# Patient Record
Sex: FEMALE | Race: WHITE | NOT HISPANIC OR LATINO | Employment: OTHER | URBAN - METROPOLITAN AREA
[De-identification: names, ages, dates, MRNs, and addresses within clinical notes are randomized per-mention and may not be internally consistent; named-entity substitution may affect disease eponyms.]

---

## 2017-03-02 ENCOUNTER — ALLSCRIPTS OFFICE VISIT (OUTPATIENT)
Dept: OTHER | Facility: OTHER | Age: 82
End: 2017-03-02

## 2017-03-02 DIAGNOSIS — Z00.00 ENCOUNTER FOR GENERAL ADULT MEDICAL EXAMINATION WITHOUT ABNORMAL FINDINGS: ICD-10-CM

## 2017-03-02 DIAGNOSIS — E78.5 HYPERLIPIDEMIA: ICD-10-CM

## 2017-06-14 ENCOUNTER — GENERIC CONVERSION - ENCOUNTER (OUTPATIENT)
Dept: OTHER | Facility: OTHER | Age: 82
End: 2017-06-14

## 2017-06-14 ENCOUNTER — ALLSCRIPTS OFFICE VISIT (OUTPATIENT)
Dept: OTHER | Facility: OTHER | Age: 82
End: 2017-06-14

## 2017-06-14 DIAGNOSIS — E78.5 HYPERLIPIDEMIA: ICD-10-CM

## 2017-06-14 DIAGNOSIS — Z00.00 ENCOUNTER FOR GENERAL ADULT MEDICAL EXAMINATION WITHOUT ABNORMAL FINDINGS: ICD-10-CM

## 2017-10-13 ENCOUNTER — TRANSCRIBE ORDERS (OUTPATIENT)
Dept: ADMINISTRATIVE | Facility: HOSPITAL | Age: 82
End: 2017-10-13

## 2017-10-13 ENCOUNTER — GENERIC CONVERSION - ENCOUNTER (OUTPATIENT)
Dept: OTHER | Facility: OTHER | Age: 82
End: 2017-10-13

## 2017-10-13 ENCOUNTER — APPOINTMENT (OUTPATIENT)
Dept: LAB | Facility: HOSPITAL | Age: 82
End: 2017-10-13
Attending: INTERNAL MEDICINE
Payer: MEDICARE

## 2017-10-13 DIAGNOSIS — E78.49 OTHER HYPERLIPIDEMIA: ICD-10-CM

## 2017-10-13 DIAGNOSIS — E78.49 OTHER HYPERLIPIDEMIA: Primary | ICD-10-CM

## 2017-10-13 LAB
ALBUMIN SERPL BCP-MCNC: 3.4 G/DL (ref 3.5–5)
ALP SERPL-CCNC: 96 U/L (ref 46–116)
ALT SERPL W P-5'-P-CCNC: 19 U/L (ref 12–78)
ANION GAP SERPL CALCULATED.3IONS-SCNC: 7 MMOL/L (ref 4–13)
AST SERPL W P-5'-P-CCNC: 18 U/L (ref 5–45)
BASOPHILS # BLD AUTO: 0.06 THOUSANDS/ΜL (ref 0–0.1)
BASOPHILS NFR BLD AUTO: 1 % (ref 0–1)
BILIRUB SERPL-MCNC: 0.6 MG/DL (ref 0.2–1)
BUN SERPL-MCNC: 17 MG/DL (ref 5–25)
CALCIUM SERPL-MCNC: 9.1 MG/DL (ref 8.3–10.1)
CHLORIDE SERPL-SCNC: 105 MMOL/L (ref 100–108)
CHOLEST SERPL-MCNC: 182 MG/DL (ref 50–200)
CO2 SERPL-SCNC: 29 MMOL/L (ref 21–32)
CREAT SERPL-MCNC: 0.52 MG/DL (ref 0.6–1.3)
EOSINOPHIL # BLD AUTO: 0.33 THOUSAND/ΜL (ref 0–0.61)
EOSINOPHIL NFR BLD AUTO: 4 % (ref 0–6)
ERYTHROCYTE [DISTWIDTH] IN BLOOD BY AUTOMATED COUNT: 14.9 % (ref 11.6–15.1)
GFR SERPL CREATININE-BSD FRML MDRD: 84 ML/MIN/1.73SQ M
GLUCOSE P FAST SERPL-MCNC: 96 MG/DL (ref 65–99)
HCT VFR BLD AUTO: 44.4 % (ref 34.8–46.1)
HDLC SERPL-MCNC: 69 MG/DL (ref 40–60)
HGB BLD-MCNC: 14.2 G/DL (ref 11.5–15.4)
LDLC SERPL CALC-MCNC: 95 MG/DL (ref 0–100)
LYMPHOCYTES # BLD AUTO: 1.63 THOUSANDS/ΜL (ref 0.6–4.47)
LYMPHOCYTES NFR BLD AUTO: 22 % (ref 14–44)
MCH RBC QN AUTO: 29.1 PG (ref 26.8–34.3)
MCHC RBC AUTO-ENTMCNC: 32 G/DL (ref 31.4–37.4)
MCV RBC AUTO: 91 FL (ref 82–98)
MONOCYTES # BLD AUTO: 0.97 THOUSAND/ΜL (ref 0.17–1.22)
MONOCYTES NFR BLD AUTO: 13 % (ref 4–12)
NEUTROPHILS # BLD AUTO: 4.57 THOUSANDS/ΜL (ref 1.85–7.62)
NEUTS SEG NFR BLD AUTO: 60 % (ref 43–75)
PLATELET # BLD AUTO: 178 THOUSANDS/UL (ref 149–390)
PMV BLD AUTO: 11.9 FL (ref 8.9–12.7)
POTASSIUM SERPL-SCNC: 3.9 MMOL/L (ref 3.5–5.3)
PROT SERPL-MCNC: 7.5 G/DL (ref 6.4–8.2)
RBC # BLD AUTO: 4.88 MILLION/UL (ref 3.81–5.12)
SODIUM SERPL-SCNC: 141 MMOL/L (ref 136–145)
TRIGL SERPL-MCNC: 89 MG/DL
TSH SERPL DL<=0.05 MIU/L-ACNC: 2.32 UIU/ML (ref 0.36–3.74)
WBC # BLD AUTO: 7.56 THOUSAND/UL (ref 4.31–10.16)

## 2017-10-13 PROCEDURE — 80053 COMPREHEN METABOLIC PANEL: CPT

## 2017-10-13 PROCEDURE — 84443 ASSAY THYROID STIM HORMONE: CPT

## 2017-10-13 PROCEDURE — 85025 COMPLETE CBC W/AUTO DIFF WBC: CPT

## 2017-10-13 PROCEDURE — 36415 COLL VENOUS BLD VENIPUNCTURE: CPT

## 2017-10-13 PROCEDURE — 80061 LIPID PANEL: CPT

## 2017-10-16 ENCOUNTER — ALLSCRIPTS OFFICE VISIT (OUTPATIENT)
Dept: OTHER | Facility: OTHER | Age: 82
End: 2017-10-16

## 2017-10-17 NOTE — PROGRESS NOTES
Assessment  1  Hypertension (401 9) (I10)  2  Hyperlipidemia (272 4) (E78 5)  3  History of Afib (427 31) (I48 91)  4  Advanced age (18) (R50)  11  Other specified counseling (V65 49) (Z71 89)    Plan  Hyperlipidemia    · Renew: Ezetimibe 10 MG Oral Tablet (Zetia); take 1 tablet by mouth once daily  Hypertension    · Renew: Lisinopril 20 MG Oral Tablet; take 1 tablet by mouth once daily   · A diet low in sodium and high in potassium, magnesium, and calcium can help your blood pressure ;  Status:Complete;   Done: 47YHN8671   · Continue with our present treatment plan ; Status:Complete;   Done: 92FWJ2149   · Take your blood pressure twice a week  Record the numbers and bring them with you to your  appointments ; Status:Complete;   Done: 42MNY2061  Need for influenza vaccination    · Administer: Fluzone High-Dose 0 5 ML Intramuscular Suspension Prefilled Syringe; INJECT 0 5  ML  Intramuscular; To Be Done: 44HAM3166  Other specified counseling    · Begin a limited exercise program ; Status:Complete;   Done: 56RZC5194  PMH: Screening for genitourinary condition    · *VB - Urinary Incontinence Screen (Dx Z13 89 Screen for UI); Status:Complete;   Done: 20YQT9643  10:31AM    Discussion/Summary  Discussion Summary:   HTN, good control, acceptable for ageimproved on zetia, does not toleratestatinage, uses a cane for balance5 wishes and encouraged to bring in to officeand prevnar done today  Counseling Documentation With Imm: The patient was counseled regarding instructions for management  Medication SE Review and Pt Understands Tx: Possible side effects of new medications were reviewed with the patient/guardian today  The treatment plan was reviewed with the patient/guardian  The patient/guardian understands and agrees with the treatment plan      Chief Complaint  Chief Complaint Free Text Note Form: Pt is here for routine follow up  Med list reviewed  Refill esent for Zetia  Lab results in chart   CR   Chief Complaint Chronic Condition [facility]: Patient is here today for follow up of chronic conditions described in HPI  Review of Systems  Complete-Female:   Constitutional: No fever, no chills, feels well, no tiredness, no recent weight gain or weight loss  Eyes: No complaints of eye pain, no red eyes, no eyesight problems, no discharge, no dry eyes, no itching of eyes  ENT: no complaints of earache, no loss of hearing, no nose bleeds, no nasal discharge, no sore throat, no hoarseness  Cardiovascular: No complaints of slow heart rate, no fast heart rate, no chest pain, no palpitations, no leg claudication, no lower extremity edema  Respiratory: No complaints of shortness of breath, no wheezing, no cough, no SOB on exertion, no orthopnea, no PND  Gastrointestinal: No complaints of abdominal pain, no constipation, no nausea or vomiting, no diarrhea, no bloody stools  Genitourinary: No complaints of dysuria, no incontinence, no pelvic pain, no dysmenorrhea, no vaginal discharge or bleeding  Musculoskeletal: No complaints of arthralgias, no myalgias, no joint swelling or stiffness, no limb pain or swelling  Integumentary: No complaints of skin rash or lesions, no itching, no skin wounds, no breast pain or lump  Neurological: No complaints of headache, no confusion, no convulsions, no numbness, no dizziness or fainting, no tingling, no limb weakness, no difficulty walking  Psychiatric: Not suicidal, no sleep disturbance, no anxiety or depression, no change in personality, no emotional problems  Endocrine: No complaints of proptosis, no hot flashes, no muscle weakness, no deepening of the voice, no feelings of weakness  Hematologic/Lymphatic: No complaints of swollen glands, no swollen glands in the neck, does not bleed easily, does not bruise easily  Active Problems  1  Hyperlipidemia (272 4) (E78 5)  2  Hypertension (401 9) (I10)  3  No advance directives (V49 89) (Z78 9)    Past Medical History  1  History of Afib (427 31) (I48 91)  2  History of Colonoscopy refused (V64 2) (Z53 20)  3  History of Elbow injury (959 3) (S52 119A)  4  History of impacted cerumen (V12 49) (Z86 69)  5  History of Mammogram declined (V64 2) (Z53 20)  6  History of Varicose vein (454 9) (I86 8)  Active Problems And Past Medical History Reviewed: The active problems and past medical history were reviewed and updated today  Family History  Mother   1  Family history of Cancer  2  Family history of Hypertension  3  Denied: Family history of Substance abuse-family  Father   4  Family history of Hypertension  Sister   5  Family history of Diabetes  Family History   6  Family history of Colon Cancer (V16 0)  7  Denied: Family history of substance abuse  8  Family history of Ischemic Stroke (V17 1)  9  Denied: Family history of Mental problem    Social History   · Caffeine Use   · Cultural background   · Never A Smoker   · No advance directives (V49 89) (Z78 9)   · No advance directives (V49 89) (Z78 9)   · Non-smoker (V49 89) (Z78 9)   · Primary language is English   · Racial background   · Supportive and safe   ·     Current Meds  1  Aspirin 81 MG TABS; TAKE 1 TABLET DAILY; Therapy: (Recorded:81Imb5465) to Recorded  2  Dorzolamide HCl-Timolol Mal 22 3-6 8 MG/ML Ophthalmic Solution; instill 1 drop into both eyes twice a   day; Therapy: 69TPZ9917 to (Evaluate:11Mar2015)  Requested for: 20Jan2015; Last Rx:20Jan2015   Ordered  3  Ezetimibe 10 MG Oral Tablet; take 1 tablet by mouth once daily; Therapy: 16EWD2077 to (Evaluate:80Egg2284)  Requested for: 13Kaz3291; Last Rx:46Nef6210   Ordered  4  Lisinopril 20 MG Oral Tablet; take 1 tablet by mouth once daily; Therapy: 69ZIG2565 to (Evaluate:17Idd9390)  Requested for: 25SZC3205; Last Rx:68Usf3658   Ordered  5  Lumigan 0 01 % Ophthalmic Solution; INSTILL 1 DROP INTO BOTH EYES ONCE DAILY IN THE   EVENING; Therapy: 84SCN4196 to (Evaluate:19Xxj7749) Recorded  6   Metoprolol Tartrate 50 MG Oral Tablet; TAKE 1 TABLET TWICE DAILY  Requested for: 70Uyf8222;   Last Rx:15Uba2056 Ordered  Medication List Reviewed: The medication list was reviewed and updated today  Allergies  1  Penicillins  2  Sulfa Drugs  3  Pollen  4  Trees    Vitals  Vital Signs    Recorded: 55GXW7527 10:31AM   Heart Rate 66, L Radial   Pulse Quality Normal, L Radial   Systolic 924, LUE, Sitting   Diastolic 78, LUE, Sitting   Weight 136 lb    BMI Calculated 24 88   BSA Calculated 1 62     Physical Exam    Constitutional   General appearance: No acute distress, well appearing and well nourished  Eyes   Conjunctiva and lids: No swelling, erythema or discharge  Pupils and irises: Equal, round and reactive to light  Ears, Nose, Mouth, and Throat   External inspection of ears and nose: Normal     Otoscopic examination: Tympanic membranes translucent with normal light reflex  Canals patent without erythema  Nasal mucosa, septum, and turbinates: Normal without edema or erythema  Oropharynx: Normal with no erythema, edema, exudate or lesions  Pulmonary   Respiratory effort: No increased work of breathing or signs of respiratory distress  Auscultation of lungs: Clear to auscultation  Cardiovascular   Palpation of heart: Normal PMI, no thrills  Auscultation of heart: Normal rate and rhythm, normal S1 and S2, without murmurs  Examination of extremities for edema and/or varicosities: Normal     Carotid pulses: Normal     Abdomen   Abdomen: Non-tender, no masses  Liver and spleen: No hepatomegaly or splenomegaly  Lymphatic   Palpation of lymph nodes in neck: No lymphadenopathy  Musculoskeletal   Gait and station: Normal     Digits and nails: Normal without clubbing or cyanosis  Inspection/palpation of joints, bones, and muscles: Normal     Skin   Skin and subcutaneous tissue: Normal without rashes or lesions  Neurologic   Cranial nerves: Cranial nerves 2-12 intact      Reflexes: 2+ and symmetric  Sensation: No sensory loss      Psychiatric   Orientation to person, place, and time: Normal     Mood and affect: Normal          Results/Data  *VB - Urinary Incontinence Screen (Dx Z13 89 Screen for UI) 42OBN5994 10:31AM [employer]ans     Test Name Result Flag Reference   Urinary Incontinence Assessment 66LYY5845       Falls Risk Assessment (Dx Z13 89 Screen for Neurologic Disorder) 14LFA5153 10:30AM User, Ahs     Test Name Result Flag Reference   Falls Risk      No falls in the past year       Signatures   Electronically signed by : PARVIN Zavaleta ; Oct 16 2017 10:51AM EST                       (Author)    Electronically signed by : PARVIN Zavaleta ; Oct 16 2017  6:08PM EST                       (Author)

## 2018-01-11 NOTE — RESULT NOTES
Verified Results  (1) CBC/PLT/DIFF 85KWV4215 12:05PM Olivia Kumar     Test Name Result Flag Reference   WBC COUNT 10 36 Thousand/uL H 4 31-10 16   RBC COUNT 5 30 Million/uL H 3 81-5 12   HEMOGLOBIN 15 3 g/dL  11 5-15 4   HEMATOCRIT 48 2 % H 34 8-46  1   MCV 91 fL  82-98   MCH 28 9 pg  26 8-34 3   MCHC 31 7 g/dL  31 4-37 4   RDW 15 0 %  11 6-15 1   MPV 11 9 fL  8 9-12 7   PLATELET COUNT 108 Thousands/uL  149-390   NEUTROPHILS RELATIVE PERCENT 62 %  43-75   LYMPHOCYTES RELATIVE PERCENT 24 %  14-44   MONOCYTES RELATIVE PERCENT 10 %  4-12   EOSINOPHILS RELATIVE PERCENT 3 %  0-6   BASOPHILS RELATIVE PERCENT 1 %  0-1   NEUTROPHILS ABSOLUTE COUNT 6 43 Thousands/µL  1 85-7 62   LYMPHOCYTES ABSOLUTE COUNT 2 52 Thousands/µL  0 60-4 47   MONOCYTES ABSOLUTE COUNT 1 07 Thousand/µL  0 17-1 22   EOSINOPHILS ABSOLUTE COUNT 0 28 Thousand/µL  0 00-0 61   BASOPHILS ABSOLUTE COUNT 0 06 Thousands/µL  0 00-0 10

## 2018-01-11 NOTE — RESULT NOTES
Verified Results  (1) CBC/PLT/DIFF 13Oct2017 08:38AM Olivia Kumar     Test Name Result Flag Reference   WBC COUNT 7 56 Thousand/uL  4 31-10 16   RBC COUNT 4 88 Million/uL  3 81-5 12   HEMOGLOBIN 14 2 g/dL  11 5-15 4   HEMATOCRIT 44 4 %  34 8-46  1   MCV 91 fL  82-98   MCH 29 1 pg  26 8-34 3   MCHC 32 0 g/dL  31 4-37 4   RDW 14 9 %  11 6-15 1   MPV 11 9 fL  8 9-12 7   PLATELET COUNT 166 Thousands/uL  149-390   NEUTROPHILS RELATIVE PERCENT 60 %  43-75   LYMPHOCYTES RELATIVE PERCENT 22 %  14-44   MONOCYTES RELATIVE PERCENT 13 % H 4-12   EOSINOPHILS RELATIVE PERCENT 4 %  0-6   BASOPHILS RELATIVE PERCENT 1 %  0-1   NEUTROPHILS ABSOLUTE COUNT 4 57 Thousands/? ??L  1 85-7 62   LYMPHOCYTES ABSOLUTE COUNT 1 63 Thousands/? ??L  0 60-4 47   MONOCYTES ABSOLUTE COUNT 0 97 Thousand/? ??L  0 17-1 22   EOSINOPHILS ABSOLUTE COUNT 0 33 Thousand/? ??L  0 00-0 61   BASOPHILS ABSOLUTE COUNT 0 06 Thousands/? ??L  0 00-0 10   This is a patient instruction: This test is non-fasting  Please drink two glasses of water morning of bloodwork

## 2018-01-12 VITALS — DIASTOLIC BLOOD PRESSURE: 78 MMHG | WEIGHT: 136 LBS | HEART RATE: 66 BPM | SYSTOLIC BLOOD PRESSURE: 162 MMHG

## 2018-01-13 VITALS
HEART RATE: 64 BPM | HEIGHT: 62 IN | WEIGHT: 141 LBS | BODY MASS INDEX: 25.95 KG/M2 | DIASTOLIC BLOOD PRESSURE: 82 MMHG | SYSTOLIC BLOOD PRESSURE: 138 MMHG

## 2018-01-15 NOTE — RESULT NOTES
Verified Results  (1) TSH 44Bqq8565 08:38AM Elma Pascal     Test Name Result Flag Reference   TSH 2 318 uIU/mL  0 358-3 740   This is a patient instruction: This test is non-fasting  Please drink two glasses of water morning of bloodwork  Patients undergoing fluorescein dye angiography may retain small amounts of fluorescein in the body for 48-72 hours post procedure  Samples containing fluorescein can produce falsely depressed TSH values  If the patient had this procedure,a specimen should be resubmitted post fluorescein clearance            The recommended reference ranges for TSH during pregnancy are as follows:  First trimester 0 1 to 2 5 uIU/mL  Second trimester  0 2 to 3 0 uIU/mL  Third trimester 0 3 to 3 0 uIU/m

## 2018-01-22 VITALS
HEART RATE: 72 BPM | SYSTOLIC BLOOD PRESSURE: 140 MMHG | DIASTOLIC BLOOD PRESSURE: 76 MMHG | BODY MASS INDEX: 25.58 KG/M2 | WEIGHT: 139 LBS | HEIGHT: 62 IN | RESPIRATION RATE: 16 BRPM

## 2018-01-23 NOTE — PROGRESS NOTES
Assessment   1  Hyperlipidemia (272 4) (E78 5)  2  Hypertension (401 9) (I10)  3  Encounter for preventive health examination (V70 0) (Z00 00)    Plan  Health Maintenance    · (1) CBC/PLT/DIFF; Status:Active; Requested HBH:[id number];    · (1) COMPREHENSIVE METABOLIC PANEL; Status:Active; Requested NWZ:[id number];    · (1) TSH; Status:Active; Requested [id number];   Hyperlipidemia    · (1) LIPID PANEL FASTING W DIRECT LDL REFLEX; Status:Active; Requested  JRV:[id number];   Hypertension    · Renew: Lisinopril 20 MG Oral Tablet; TAKE 1 TABLET DAILY    Discussion/Summary    HTN, stable on meds  LIpids, stable on meds    patient defers routine labs due to age  will obtain prn only    MC questions answered and reviewed  Impression: Subsequent Annual Wellness Visit, with preventive exam as well as age and risk appropriate counseling completed  Advance Directive [place]  paperwork and instructions were given to the patient1 , she was encouraged to follow-up with me to discuss her questions and/or decisions1 , 5 wishes given1   Possible side effects of new medications were reviewed with the patient/guardian today  The treatment plan was reviewed with the patient/guardian  The patient/guardian understands and agrees with the treatment plan     Self Referrals: No       1 Amended By: Lee Doan; 2017 1:52 PM EST    Chief Complaint  Pt here for [place] [address] today  No falls, has pos urine incont---forms ordered  dk    nsr since last here  History of Present Illness  HPI: c/o problems with moving bowels  Welcome to [employer] and Wellness Visits: The patient is being seen for the subsequent annual wellness visit  Medicare Screening and Risk Factors   Hospitalizations: no previous hospitalizations  Medicare Screening Tests Risk Questions   Abdominal aortic aneurysm risk assessment: none indicated     Osteoporosis risk assessment: , female gender, over 48years of age and past medical history of fracture(s)  Drug and Alcohol Use: The patient has never smoked cigarettes and has never used smokeless tobacco  The patient reports never drinking alcohol  She has never used illicit drugs  Diet and Physical Activity: Current diet includes well balanced meals, low salt food choices, limited junk food, 1 servings of fruit per day, 2 servings of dairy products per day, 2 cups of coffee per day, 0 cups of tea per day, 0 cans of regular soda per day and 0 cans of diet soda per day  Mood Disorder and Cognitive Impairment Screening:   Depression screening  negative for symptoms  Functional Ability/Level of Safety: Hearing is slightly decreased in the right ear, slightly decreased in the left ear and a hearing aid is not used  Fall risk factors:  urinary incontinence, but The patient fell 0 times in the past 12 months  Advance Directives: Advance directives: no living will and no advance directives  Co-Managers and Medical Equipment/Suppliers: See Patient Care Team   Reviewed Updated [facility]:   Last Medicare Wellness Visit Information was reviewed, patient interviewed, no change since last AW  Preventive Quality Program 65 and Older: Falls Risk: The patient fell 0 times in the past 12 months  Urinary Incontinence Symptoms includes: urinary incontinence        Patient Care Team    Care Team Member Role Specialty Office Number   Kaye Garner MD  Orthopedic Surgery [phone]   Tanya DYSON  Attending Internal Medicine [phone]     Review of Systems    Constitutional: negative  Eyes: negative  ENT: negative  Cardiovascular: negative  Respiratory: negative  Gastrointestinal: negative  Genitourinary: negative  Musculoskeletal: negative  Integumentary and Breasts: negative  Neurological: negative  Psychiatric: negative  Endocrine: negative  Hematologic and Lymphatic: negative  Active Problems   1  Hyperlipidemia (272 4) (E78 5)  2  Hypertension (401 9) (I10)  3  No advance directives (V49 89) (Z78 9)    Past Medical History    · History of Afib (427 31) (I48 91)   · History of Colonoscopy refused (V64 2) (Z53 20)   · History of Elbow injury (959 3) (S59 909A)   · History of impacted cerumen (V12 49) (Z86 69)   · History of Mammogram declined (V64 2) (Z53 20)   · History of Varicose vein (454 9) (I86 8)    Family History  Mother    · Family history of Cancer   · Family history of Hypertension   · Denied: Family history of Substance abuse-family  Father    · Family history of Hypertension  Sister    · Family history of Diabetes  Family History    · Family history of Colon Cancer (V16 0)   · Denied: Family history of substance abuse   · Family history of Ischemic Stroke (V17 1)   · Denied: Family history of Mental problem    Social History    · Caffeine Use   · Cultural background   · no    · Never A Smoker   · No advance directives (V49 89) (Z78 9)   · No advance directives (V49 89) (Z78 9)   · Non-smoker (V49 89) (Z78 9)   · Primary language is English   · Racial background   · white   · Supportive and safe   ·     Current Meds  1  Aspirin 81 MG TABS; TAKE 1 TABLET DAILY; Therapy: (Recorded:14Edb1889) to Recorded  2  Dorzolamide HCl-Timolol Mal 22 3-6 8 MG/ML Ophthalmic Solution; instill 1 drop into   both eyes twice a day; Therapy: 66LGM0433 to (Evaluate:11Mar2015)  Requested for: 20Jan2015; Last   Rx:20Jan2015 Ordered  3  Ezetimibe 10 MG Oral Tablet; take 1 tablet by mouth once daily; Therapy: 68GEZ5241 to (Evaluate:67Jah9325)  Requested for: 22Feb2017; Last   Rx:22Feb2017 Ordered  4  Lisinopril 20 MG Oral Tablet; TAKE 1 TABLET DAILY  Requested for: 67POG2963; Last   Rx:96Pgt3292 Ordered  5  Lumigan 0 01 % Ophthalmic Solution; INSTILL 1 DROP INTO BOTH EYES ONCE DAILY   IN THE EVENING; Therapy: 27ZGA3857 to (Evaluate:07Feb2015) Recorded  6   Metoprolol Tartrate 50 MG Oral Tablet; TAKE 1 TABLET TWICE DAILY  Requested for:   36Vnb5550; Last Rx:55Knj5798 Ordered    Allergies   1  Penicillins  2  Sulfa Drugs   3  Pollen  4  Trees    Immunizations   [place] in Appendix #1 below  Vitals  Signs    Heart Rate: 72  Respiration: 16  Systolic: 000  Diastolic: 76  Height: 5 ft 2 in  Weight: 139 lb   BMI Calculated: 25 42  BSA Calculated: 1 64    Physical Exam    Constitutional   General appearance: No acute distress, well appearing and well nourished  Eyes   Conjunctiva and lids: No swelling, erythema or discharge  Pupils and irises: Equal, round, reactive to light  Ophthalmoscopic examination: Normal fundi and optic discs  Ears, Nose, Mouth, and Throat   External inspection of ears and nose: Normal     Otoscopic examination: Tympanic membranes translucent with normal light reflex  Canals patent without erythema  Hearing: Normal     Nasal mucosa, septum, and turbinates: Normal without edema or erythema  Lips, teeth, and gums: Normal, good dentition  Oropharynx: Normal with no erythema, edema, exudate or lesions  Neck   Neck: Supple, symmetric, trachea midline, no masses  Thyroid: Normal, no thyromegaly  Pulmonary   Respiratory effort: No increased work of breathing or signs of respiratory distress  Percussion of chest: Normal     Palpation of chest: Normal     Auscultation of lungs: Clear to auscultation  Cardiovascular   Palpation of heart: Normal PMI, no thrills  Auscultation of heart: Normal rate and rhythm, normal S1 and S2, no murmurs  Carotid pulses: 2+ bilaterally  Abdominal aorta: Normal     Femoral pulses: 2+ bilaterally  Pedal pulses: 2+ bilaterally  Examination of extremities for edema and/or varicosities: Normal     Chest   Breasts: Normal, no dimpling or skin changes appreciated  Palpation of breasts and axillae: Normal, no masses palpated  Abdomen   Abdomen: Non-tender, no masses  Liver and spleen: No hepatomegaly or splenomegaly  Examination for hernias: No hernia appreciated      Anus, perineum, and rectum: Normal sphincter tone, no masses, no prolapse  Stool sample for occult blood: Negative  Genitourinary   External genitalia and vagina: Normal, no lesions appreciated  Urethra: Normal, no discharge  Bladder: Not distended, no tenderness  Cervix: Normal, no lesions  Uterus: Normal size, no tenderness, no masses  Adnexa/Parametria: Normal, no masses or tenderness  Lymphatic   Palpation of lymph nodes in neck: No lymphadenopathy  Palpation of lymph nodes in axillae: No lymphadenopathy  Palpation of lymph nodes in groin: No lymphadenopathy  Palpation of lymph nodes in other areas: No lymphadenopathy  Musculoskeletal   Gait and station: Normal     Digits and nails: Normal without clubbing or cyanosis  Joints, bones, and muscles: Normal     Range of motion: Normal     Stability: Normal     Muscle strength/tone: Normal     Skin   Skin and subcutaneous tissue: Normal without rashes or lesions  Palpation of skin and subcutaneous tissue: Normal turgor  Neurologic   Cranial nerves: Cranial nerves II-XII intact  Reflexes: 2+ and symmetric  Sensation: No sensory loss  Psychiatric   Judgment and insight: Normal     Orientation to person, place, and time: Normal     Recent and remote memory: Intact      Mood and affect: Normal        Signatures   Electronically signed by : PARVIN Wade ; 2017  1:53PM EST                       (Author)    Appendix #1     Patient: Lizbeth Stevenson ; : 1926; MRN: [id number]      1 2 3 4 5 6    Influenza  21-Oct-2010 20-Sep-2011 05-Oct-2012 09-Nov-2013 24-Nov-2014 19-Oct-2015    Pneumo Other  15-Paolo-2011         Tdap  Temporarily Deferred: Pt requests deferral

## 2018-03-14 DIAGNOSIS — I10 ESSENTIAL HYPERTENSION: Primary | ICD-10-CM

## 2018-03-14 RX ORDER — METOPROLOL TARTRATE 50 MG/1
50 TABLET, FILM COATED ORAL 2 TIMES DAILY
Qty: 60 TABLET | Refills: 5 | Status: SHIPPED | OUTPATIENT
Start: 2018-03-14 | End: 2018-09-10 | Stop reason: SDUPTHER

## 2018-03-14 RX ORDER — METOPROLOL TARTRATE 50 MG/1
1 TABLET, FILM COATED ORAL 2 TIMES DAILY
COMMUNITY
End: 2018-03-14 | Stop reason: SDUPTHER

## 2018-05-17 DIAGNOSIS — E78.5 HYPERLIPIDEMIA, UNSPECIFIED HYPERLIPIDEMIA TYPE: Primary | ICD-10-CM

## 2018-05-17 DIAGNOSIS — I10 ESSENTIAL HYPERTENSION: ICD-10-CM

## 2018-05-17 RX ORDER — LISINOPRIL 20 MG/1
1 TABLET ORAL DAILY
COMMUNITY
Start: 2017-07-04 | End: 2018-05-17 | Stop reason: SDUPTHER

## 2018-05-17 RX ORDER — LISINOPRIL 20 MG/1
20 TABLET ORAL DAILY
Qty: 30 TABLET | Refills: 5 | Status: SHIPPED | OUTPATIENT
Start: 2018-05-17 | End: 2019-01-09 | Stop reason: SDUPTHER

## 2018-05-17 RX ORDER — EZETIMIBE 10 MG/1
1 TABLET ORAL DAILY
COMMUNITY
Start: 2015-06-16 | End: 2018-05-17 | Stop reason: SDUPTHER

## 2018-05-17 RX ORDER — EZETIMIBE 10 MG/1
10 TABLET ORAL DAILY
Qty: 30 TABLET | Refills: 5 | Status: SHIPPED | OUTPATIENT
Start: 2018-05-17 | End: 2018-12-08 | Stop reason: SDUPTHER

## 2018-07-16 ENCOUNTER — OFFICE VISIT (OUTPATIENT)
Dept: FAMILY MEDICINE CLINIC | Facility: HOSPITAL | Age: 83
End: 2018-07-16
Payer: MEDICARE

## 2018-07-16 VITALS
BODY MASS INDEX: 22.82 KG/M2 | HEART RATE: 71 BPM | HEIGHT: 62 IN | SYSTOLIC BLOOD PRESSURE: 166 MMHG | WEIGHT: 124 LBS | DIASTOLIC BLOOD PRESSURE: 72 MMHG

## 2018-07-16 DIAGNOSIS — L30.9 DERMATITIS: Primary | ICD-10-CM

## 2018-07-16 PROCEDURE — 99213 OFFICE O/P EST LOW 20 MIN: CPT | Performed by: PHYSICIAN ASSISTANT

## 2018-07-16 RX ORDER — DORZOLAMIDE HYDROCHLORIDE AND TIMOLOL MALEATE 20; 5 MG/ML; MG/ML
SOLUTION/ DROPS OPHTHALMIC 2 TIMES DAILY
COMMUNITY
Start: 2014-10-08

## 2018-07-16 RX ORDER — FLUCONAZOLE 150 MG/1
150 TABLET ORAL DAILY
Qty: 3 TABLET | Refills: 0 | Status: SHIPPED | OUTPATIENT
Start: 2018-07-16 | End: 2018-07-19

## 2018-07-16 RX ORDER — TRIAMCINOLONE ACETONIDE 0.25 MG/G
CREAM TOPICAL 2 TIMES DAILY
Qty: 30 G | Refills: 0 | Status: SHIPPED | OUTPATIENT
Start: 2018-07-16 | End: 2018-09-11

## 2018-07-16 NOTE — PROGRESS NOTES
Assessment/Plan:         Diagnoses and all orders for this visit:    Dermatitis  -     fluconazole (DIFLUCAN) 150 mg tablet; Take 1 tablet (150 mg total) by mouth daily for 3 doses  -     triamcinolone (KENALOG) 0 025 % cream; Apply topically 2 (two) times a day      Subjective:      Patient ID: Shira Chauhan is a 80 y o  female  80year old white female c/o rash and pruritis spreading  all over past few days  No new medications or supplements  Denies working in yard or with chemicals  No recent travels, did start new toothpaste  Sister recently , , and patient touched her hand  Patient has urinary incontinence wears a pad  Believes has a yeast infection, but no vaginal discharge  Rash   Pertinent negatives include no cough, fatigue, fever or shortness of breath  Review of Systems   Constitutional: Negative for chills, diaphoresis, fatigue and fever  Respiratory: Negative for cough and shortness of breath  Genitourinary: Negative for vaginal discharge  Skin: Positive for rash  Objective:      /72   Pulse 71   Ht 5' 2" (1 575 m)   Wt 56 2 kg (124 lb)   BMI 22 68 kg/m²          Physical Exam   Constitutional: She is oriented to person, place, and time  She appears well-developed and well-nourished  No distress  Frail elderly female, hard of hearing, using cane to ambulate  HENT:   Head: Normocephalic and atraumatic  Pulmonary/Chest: Effort normal and breath sounds normal    Neurological: She is alert and oriented to person, place, and time  Skin: Rash noted  She is not diaphoretic  There is erythema  Dispersed, erythematous rash noted, on upper body, some on thighs  Nursing note and vitals reviewed

## 2018-07-16 NOTE — PATIENT INSTRUCTIONS
Start Fluconazole daily for 3 days, and Kenalog cream bid for itching  Can try Zyrtec for itching also  Call or return to office if sx   Persist

## 2018-09-10 DIAGNOSIS — I10 ESSENTIAL HYPERTENSION: ICD-10-CM

## 2018-09-10 RX ORDER — METOPROLOL TARTRATE 50 MG/1
50 TABLET, FILM COATED ORAL 2 TIMES DAILY
Qty: 60 TABLET | Refills: 5 | Status: SHIPPED | OUTPATIENT
Start: 2018-09-10 | End: 2018-10-24 | Stop reason: HOSPADM

## 2018-09-11 ENCOUNTER — APPOINTMENT (INPATIENT)
Dept: NON INVASIVE DIAGNOSTICS | Facility: HOSPITAL | Age: 83
DRG: 280 | End: 2018-09-11
Payer: MEDICARE

## 2018-09-11 ENCOUNTER — HOSPITAL ENCOUNTER (INPATIENT)
Facility: HOSPITAL | Age: 83
LOS: 3 days | Discharge: NURSING, REHAB OR LONG-TERM CARE | DRG: 280 | End: 2018-09-14
Attending: EMERGENCY MEDICINE | Admitting: INTERNAL MEDICINE
Payer: MEDICARE

## 2018-09-11 ENCOUNTER — APPOINTMENT (EMERGENCY)
Dept: RADIOLOGY | Facility: HOSPITAL | Age: 83
DRG: 280 | End: 2018-09-11
Payer: MEDICARE

## 2018-09-11 DIAGNOSIS — I48.91 ATRIAL FIBRILLATION WITH RVR (HCC): Primary | ICD-10-CM

## 2018-09-11 DIAGNOSIS — R54 ADVANCED AGE: ICD-10-CM

## 2018-09-11 DIAGNOSIS — B36.9 FUNGAL DERMATITIS: Chronic | ICD-10-CM

## 2018-09-11 DIAGNOSIS — I50.32 CHRONIC DIASTOLIC CONGESTIVE HEART FAILURE (HCC): ICD-10-CM

## 2018-09-11 DIAGNOSIS — I10 ESSENTIAL HYPERTENSION: ICD-10-CM

## 2018-09-11 DIAGNOSIS — K59.09 OTHER CONSTIPATION: ICD-10-CM

## 2018-09-11 PROBLEM — Z91.14 NONCOMPLIANCE WITH MEDICATION REGIMEN: Status: ACTIVE | Noted: 2018-09-11

## 2018-09-11 LAB
ALBUMIN SERPL BCP-MCNC: 3.5 G/DL (ref 3.5–5)
ALP SERPL-CCNC: 97 U/L (ref 46–116)
ALT SERPL W P-5'-P-CCNC: 18 U/L (ref 12–78)
ANION GAP SERPL CALCULATED.3IONS-SCNC: 11 MMOL/L (ref 4–13)
APTT PPP: 26 SECONDS (ref 24–36)
AST SERPL W P-5'-P-CCNC: 14 U/L (ref 5–45)
ATRIAL RATE: 131 BPM
BASOPHILS # BLD AUTO: 0.05 THOUSANDS/ΜL (ref 0–0.1)
BASOPHILS NFR BLD AUTO: 1 % (ref 0–1)
BILIRUB SERPL-MCNC: 0.5 MG/DL (ref 0.2–1)
BILIRUB UR QL STRIP: NEGATIVE
BUN SERPL-MCNC: 23 MG/DL (ref 5–25)
CALCIUM SERPL-MCNC: 9.6 MG/DL (ref 8.3–10.1)
CHLORIDE SERPL-SCNC: 104 MMOL/L (ref 100–108)
CLARITY UR: CLEAR
CO2 SERPL-SCNC: 28 MMOL/L (ref 21–32)
COLOR UR: YELLOW
CREAT SERPL-MCNC: 0.71 MG/DL (ref 0.6–1.3)
EOSINOPHIL # BLD AUTO: 0.16 THOUSAND/ΜL (ref 0–0.61)
EOSINOPHIL NFR BLD AUTO: 2 % (ref 0–6)
ERYTHROCYTE [DISTWIDTH] IN BLOOD BY AUTOMATED COUNT: 14 % (ref 11.6–15.1)
GFR SERPL CREATININE-BSD FRML MDRD: 74 ML/MIN/1.73SQ M
GLUCOSE SERPL-MCNC: 89 MG/DL (ref 65–140)
GLUCOSE UR STRIP-MCNC: NEGATIVE MG/DL
HCT VFR BLD AUTO: 43.1 % (ref 34.8–46.1)
HGB BLD-MCNC: 13.9 G/DL (ref 11.5–15.4)
HGB UR QL STRIP.AUTO: NEGATIVE
IMM GRANULOCYTES # BLD AUTO: 0.06 THOUSAND/UL (ref 0–0.2)
IMM GRANULOCYTES NFR BLD AUTO: 1 % (ref 0–2)
INR PPP: 1.06 (ref 0.86–1.17)
KETONES UR STRIP-MCNC: ABNORMAL MG/DL
LEUKOCYTE ESTERASE UR QL STRIP: NEGATIVE
LYMPHOCYTES # BLD AUTO: 1.79 THOUSANDS/ΜL (ref 0.6–4.47)
LYMPHOCYTES NFR BLD AUTO: 22 % (ref 14–44)
MAGNESIUM SERPL-MCNC: 1.8 MG/DL (ref 1.6–2.6)
MCH RBC QN AUTO: 29.4 PG (ref 26.8–34.3)
MCHC RBC AUTO-ENTMCNC: 32.3 G/DL (ref 31.4–37.4)
MCV RBC AUTO: 91 FL (ref 82–98)
MONOCYTES # BLD AUTO: 0.86 THOUSAND/ΜL (ref 0.17–1.22)
MONOCYTES NFR BLD AUTO: 11 % (ref 4–12)
NEUTROPHILS # BLD AUTO: 5.16 THOUSANDS/ΜL (ref 1.85–7.62)
NEUTS SEG NFR BLD AUTO: 63 % (ref 43–75)
NITRITE UR QL STRIP: NEGATIVE
NRBC BLD AUTO-RTO: 0 /100 WBCS
NT-PROBNP SERPL-MCNC: 222 PG/ML
PH UR STRIP.AUTO: 7 [PH] (ref 4.5–8)
PLATELET # BLD AUTO: 224 THOUSANDS/UL (ref 149–390)
PMV BLD AUTO: 11.2 FL (ref 8.9–12.7)
POTASSIUM SERPL-SCNC: 3.6 MMOL/L (ref 3.5–5.3)
PROT SERPL-MCNC: 6.7 G/DL (ref 6.4–8.2)
PROT UR STRIP-MCNC: NEGATIVE MG/DL
PROTHROMBIN TIME: 13.2 SECONDS (ref 11.8–14.2)
QRS AXIS: -26 DEGREES
QRSD INTERVAL: 74 MS
QT INTERVAL: 310 MS
QTC INTERVAL: 457 MS
RBC # BLD AUTO: 4.72 MILLION/UL (ref 3.81–5.12)
SODIUM SERPL-SCNC: 143 MMOL/L (ref 136–145)
SP GR UR STRIP.AUTO: 1.01 (ref 1–1.03)
T WAVE AXIS: 266 DEGREES
TROPONIN I SERPL-MCNC: 0.11 NG/ML
TROPONIN I SERPL-MCNC: 0.15 NG/ML
TROPONIN I SERPL-MCNC: <0.02 NG/ML
TSH SERPL DL<=0.05 MIU/L-ACNC: 2.01 UIU/ML (ref 0.36–3.74)
UROBILINOGEN UR QL STRIP.AUTO: 0.2 E.U./DL
VENTRICULAR RATE: 131 BPM
WBC # BLD AUTO: 8.08 THOUSAND/UL (ref 4.31–10.16)

## 2018-09-11 PROCEDURE — 97535 SELF CARE MNGMENT TRAINING: CPT

## 2018-09-11 PROCEDURE — 83735 ASSAY OF MAGNESIUM: CPT | Performed by: EMERGENCY MEDICINE

## 2018-09-11 PROCEDURE — 85730 THROMBOPLASTIN TIME PARTIAL: CPT | Performed by: EMERGENCY MEDICINE

## 2018-09-11 PROCEDURE — G8978 MOBILITY CURRENT STATUS: HCPCS

## 2018-09-11 PROCEDURE — G8979 MOBILITY GOAL STATUS: HCPCS

## 2018-09-11 PROCEDURE — 93005 ELECTROCARDIOGRAM TRACING: CPT

## 2018-09-11 PROCEDURE — 84443 ASSAY THYROID STIM HORMONE: CPT | Performed by: INTERNAL MEDICINE

## 2018-09-11 PROCEDURE — 93306 TTE W/DOPPLER COMPLETE: CPT | Performed by: INTERNAL MEDICINE

## 2018-09-11 PROCEDURE — 71045 X-RAY EXAM CHEST 1 VIEW: CPT

## 2018-09-11 PROCEDURE — 36415 COLL VENOUS BLD VENIPUNCTURE: CPT | Performed by: EMERGENCY MEDICINE

## 2018-09-11 PROCEDURE — G8987 SELF CARE CURRENT STATUS: HCPCS

## 2018-09-11 PROCEDURE — 80053 COMPREHEN METABOLIC PANEL: CPT | Performed by: EMERGENCY MEDICINE

## 2018-09-11 PROCEDURE — 93010 ELECTROCARDIOGRAM REPORT: CPT | Performed by: INTERNAL MEDICINE

## 2018-09-11 PROCEDURE — 99223 1ST HOSP IP/OBS HIGH 75: CPT | Performed by: INTERNAL MEDICINE

## 2018-09-11 PROCEDURE — 84484 ASSAY OF TROPONIN QUANT: CPT | Performed by: EMERGENCY MEDICINE

## 2018-09-11 PROCEDURE — G8988 SELF CARE GOAL STATUS: HCPCS

## 2018-09-11 PROCEDURE — 83880 ASSAY OF NATRIURETIC PEPTIDE: CPT | Performed by: EMERGENCY MEDICINE

## 2018-09-11 PROCEDURE — 99285 EMERGENCY DEPT VISIT HI MDM: CPT

## 2018-09-11 PROCEDURE — 81003 URINALYSIS AUTO W/O SCOPE: CPT | Performed by: EMERGENCY MEDICINE

## 2018-09-11 PROCEDURE — 85025 COMPLETE CBC W/AUTO DIFF WBC: CPT | Performed by: EMERGENCY MEDICINE

## 2018-09-11 PROCEDURE — 93306 TTE W/DOPPLER COMPLETE: CPT

## 2018-09-11 PROCEDURE — 97166 OT EVAL MOD COMPLEX 45 MIN: CPT

## 2018-09-11 PROCEDURE — 85610 PROTHROMBIN TIME: CPT | Performed by: EMERGENCY MEDICINE

## 2018-09-11 PROCEDURE — 97163 PT EVAL HIGH COMPLEX 45 MIN: CPT

## 2018-09-11 PROCEDURE — 96374 THER/PROPH/DIAG INJ IV PUSH: CPT

## 2018-09-11 RX ORDER — ASPIRIN 81 MG/1
81 TABLET ORAL DAILY
Status: DISCONTINUED | OUTPATIENT
Start: 2018-09-11 | End: 2018-09-14 | Stop reason: HOSPADM

## 2018-09-11 RX ORDER — DORZOLAMIDE HYDROCHLORIDE AND TIMOLOL MALEATE 20; 5 MG/ML; MG/ML
1 SOLUTION/ DROPS OPHTHALMIC 2 TIMES DAILY
Status: DISCONTINUED | OUTPATIENT
Start: 2018-09-11 | End: 2018-09-14 | Stop reason: HOSPADM

## 2018-09-11 RX ORDER — LISINOPRIL 20 MG/1
20 TABLET ORAL DAILY
Status: DISCONTINUED | OUTPATIENT
Start: 2018-09-11 | End: 2018-09-14 | Stop reason: HOSPADM

## 2018-09-11 RX ORDER — ACETAMINOPHEN 325 MG/1
650 TABLET ORAL EVERY 6 HOURS PRN
Status: DISCONTINUED | OUTPATIENT
Start: 2018-09-11 | End: 2018-09-14 | Stop reason: HOSPADM

## 2018-09-11 RX ORDER — METOPROLOL TARTRATE 5 MG/5ML
10 INJECTION INTRAVENOUS ONCE
Status: COMPLETED | OUTPATIENT
Start: 2018-09-11 | End: 2018-09-11

## 2018-09-11 RX ORDER — NYSTATIN 100000 [USP'U]/G
POWDER TOPICAL 2 TIMES DAILY
Status: DISCONTINUED | OUTPATIENT
Start: 2018-09-11 | End: 2018-09-14 | Stop reason: HOSPADM

## 2018-09-11 RX ORDER — METOPROLOL TARTRATE 50 MG/1
50 TABLET, FILM COATED ORAL 2 TIMES DAILY
Status: DISCONTINUED | OUTPATIENT
Start: 2018-09-11 | End: 2018-09-14 | Stop reason: HOSPADM

## 2018-09-11 RX ORDER — DOCUSATE SODIUM 100 MG/1
100 CAPSULE, LIQUID FILLED ORAL 2 TIMES DAILY
Status: DISCONTINUED | OUTPATIENT
Start: 2018-09-11 | End: 2018-09-14 | Stop reason: HOSPADM

## 2018-09-11 RX ORDER — EZETIMIBE 10 MG/1
10 TABLET ORAL DAILY
Status: DISCONTINUED | OUTPATIENT
Start: 2018-09-11 | End: 2018-09-14 | Stop reason: HOSPADM

## 2018-09-11 RX ORDER — ONDANSETRON 2 MG/ML
4 INJECTION INTRAMUSCULAR; INTRAVENOUS EVERY 6 HOURS PRN
Status: DISCONTINUED | OUTPATIENT
Start: 2018-09-11 | End: 2018-09-14 | Stop reason: HOSPADM

## 2018-09-11 RX ADMIN — DORZOLAMIDE HYDROCHLORIDE AND TIMOLOL MALEATE 1 DROP: 20; 5 SOLUTION/ DROPS OPHTHALMIC at 17:14

## 2018-09-11 RX ADMIN — METOPROLOL TARTRATE 10 MG: 1 INJECTION, SOLUTION INTRAVENOUS at 07:19

## 2018-09-11 RX ADMIN — EZETIMIBE 10 MG: 10 TABLET ORAL at 09:09

## 2018-09-11 RX ADMIN — ENOXAPARIN SODIUM 30 MG: 30 INJECTION SUBCUTANEOUS at 09:09

## 2018-09-11 RX ADMIN — METOPROLOL TARTRATE 50 MG: 50 TABLET ORAL at 09:10

## 2018-09-11 RX ADMIN — DOCUSATE SODIUM 100 MG: 100 CAPSULE, LIQUID FILLED ORAL at 09:09

## 2018-09-11 RX ADMIN — NYSTATIN 1 APPLICATION: 100000 POWDER TOPICAL at 09:05

## 2018-09-11 RX ADMIN — BIMATOPROST 1 DROP: 0.1 SOLUTION/ DROPS OPHTHALMIC at 21:54

## 2018-09-11 RX ADMIN — SODIUM CHLORIDE 500 ML: 0.9 INJECTION, SOLUTION INTRAVENOUS at 07:19

## 2018-09-11 RX ADMIN — METOPROLOL TARTRATE 50 MG: 50 TABLET ORAL at 17:13

## 2018-09-11 RX ADMIN — NYSTATIN: 100000 POWDER TOPICAL at 17:14

## 2018-09-11 RX ADMIN — DORZOLAMIDE HYDROCHLORIDE AND TIMOLOL MALEATE 1 DROP: 20; 5 SOLUTION/ DROPS OPHTHALMIC at 09:08

## 2018-09-11 RX ADMIN — LISINOPRIL 20 MG: 20 TABLET ORAL at 09:10

## 2018-09-11 NOTE — H&P
Assessment/Plan     Principal Problem:    Atrial fibrillation with rapid ventricular response (HCC)  Active Problems:    Noncompliance with medication regimen    Hyperlipidemia    Hypertension    Advanced age    Fungal dermatitis       Atrial fibrillation with rapid rates-likely due to the fact that she ran out of her meds almost a week ago for the metoprolol  Will check TSH level   Anxiety-patient reports she is concerned about financial issues-nephew and his family live with her  Hypertension-had marked elevated reading on arrival to step-down unit with systolic over 370 but repeat was 148-given IV beta-blocker in the emergency room-will resume her usual p o  doses and monitor  Hyperlipidemia-had last labs done in October 2017-will order lipid profile for the a teri Hewitt is an 80 y o  female  HPI:  Patient presents to the ER with feeling of rapid heart rates  She has had prior atrial fibrillation and admits that she ran out of her metoprolol last Tuesday  She called the office number for refills over the weekend was told the office was closed and did not ask to speak to the physician on call  She denies lightheadedness or dizziness today  She reports that she does drive herself to the pharmacy to  her meds but seems somewhat overwhelmed with following       Prescriptions Prior to Admission   Medication    bimatoprost (LUMIGAN) 0 01 % ophthalmic drops    aspirin 81 MG tablet    dorzolamide-timolol (COSOPT) 22 3-6 8 MG/ML ophthalmic solution    ezetimibe (ZETIA) 10 mg tablet    lisinopril (ZESTRIL) 20 mg tablet    metoprolol tartrate (LOPRESSOR) 50 mg tablet     History reviewed  No pertinent surgical history  Review of Systems   All other systems reviewed and are negative        Objective     /69 (BP Location: Left arm)   Pulse (!) 119   Temp (!) 97 °F (36 1 °C) (Temporal)   Resp 19   Wt 55 3 kg (122 lb)   SpO2 97%   BMI 22 31 kg/m²     Physical Exam Constitutional: She is oriented to person, place, and time  She appears well-developed  No distress  HENT:   Head: Normocephalic and atraumatic  Mouth/Throat: No oropharyngeal exudate  Eyes: Right eye exhibits no discharge  Left eye exhibits no discharge  No scleral icterus  Neck: No JVD present  No tracheal deviation present  No thyromegaly present  Cardiovascular:   irreg irreg rates 115   Pulmonary/Chest: Effort normal and breath sounds normal  No respiratory distress  She has no wheezes  She has no rales  Abdominal: Soft  Bowel sounds are normal  She exhibits no distension  There is no tenderness  There is no rebound  Musculoskeletal: Normal range of motion  She exhibits no edema or tenderness  Neurological: She is alert and oriented to person, place, and time  Coordination normal    Skin: No rash noted  There is erythema  Some sacral redness but no open areas noted   Psychiatric:   Anxious   Nursing note and vitals reviewed  [facility]

## 2018-09-11 NOTE — OCCUPATIONAL THERAPY NOTE
[address] Evaluation     Patient Name: Danielle Ortega  PCSNL'P Date: 9/11/2018  Problem List  Patient Active Problem List   Diagnosis    Atrial fibrillation with rapid ventricular response ([address] )    Hyperlipidemia    Hypertension    Advanced age   [facility] Noncompliance with medication regimen    Fungal dermatitis     Past Medical History  Past Medical History:   Diagnosis Date    A-fib [facility])     last assessed 10/16/17    History of varicose veins      Past Surgical History  History reviewed  No pertinent surgical history  09/11/18 1205   Note Type   Note type Eval/Treat   Restrictions/Precautions   Weight Bearing Precautions Per Order No   Other Precautions Cognitive; Chair Alarm; Bed Alarm;Telemetry; Fall Risk;Hard of hearing   Pain Assessment   Pain Assessment No/denies pain   Pain Score No Pain   Home Living   Type of [address] [place] [address] One level;Stairs to enter with rails  (4 CORNEL through back entrance per pt)   Bathroom Shower/Tub Tub/shower unit   Bathroom Toilet Standard   Bathroom Equipment Grab bars in shower; Shower chair   Bathroom Accessibility Accessible   Home Equipment Cane;Walker   Additional Comments Pt lives with 2 nephews in a one level home with 4 CORNEL through back entrance  Pt reports family is able to assist, however pt (+) home alone at times  Prior Function   Level of [place] Independent with ADLs and functional mobility; Needs assistance with IADLs   Lives With Family; Other (Comment)  (2 nephews)   Receives Help From Family   ADL Assistance Independent   IADLs Needs assistance  (reports I w/ meal prep; Assist w/ cleaning and laundry)   Falls in the last 6 months 0   Vocational Retired   Comments At baseline, pt was I w/ ADLs and functional mobility/transfers w/ use of SPC, required assist w/ IADLs (reports I w/ meal prep, assist w/ cleaning/laundry   ? reliability), (+) , and reports 0 falls PTA     Lifestyle   Autonomy At baseline, pt was I w/ ADLs and functional mobility/transfers w/ use of SPC, required assist w/ IADLs (reports I w/ meal prep, assist w/ cleaning/laundry   ? reliability), (+) , and reports 0 falls PTA  Reciprocal Relationships Lives with 2 nephews   Service to Others Retired   Intrinsic Gratification Watching TV   Psychosocial   Psychosocial (WDL) [address] [place]  Supervision/Setup   Grooming Assistance 4  Minimal Assistance   [address] 4  Minimal Assistance   LB [address] 3  Moderate Assistance   [address] 4  Minimal Assistance    [facility] 3  Moderate [address]  3  Moderate Assistance   Bed Mobility   Supine to Sit Unable to assess  (Pt seated OOB at start/end of session)   Sit to Supine Unable to assess  (Pt seated OOB at start/end of session)   Additional Comments Pt seated OOB in chair at end of sesion with chair alarm activated  Call bell and phone within reach  All needs met and pt reports no further questions for OT at this time   Transfers   Sit to Stand 4  Minimal assistance   Additional items Assist x 1; Armrests; Increased time required;Verbal cues   Stand to Sit 4  Minimal assistance   Additional items Assist x 1; Armrests; Increased time required;Verbal cues   Additional Comments Cues for safe technique, positioning, and hand placement   Functional Mobility   Functional Mobility 4  Minimal assistance   Additional Comments Assist x1   Additional items Rolling walker   Balance   Static Sitting Fair +   Dynamic Sitting Fair   Static Standing Fair -   Dynamic Standing Poor +   Ambulatory Poor +   Activity Tolerance   Activity Tolerance Patient limited by fatigue   Nurse Made Aware Pt appropriate to be seen per nursing   RUE Assessment   RUE Assessment WFL   RUE Strength   RUE Overall Strength Within Functional Limits - able to perform ADL tasks with strength  (3/5 throughout)   LUE Assessment   LUE Assessment WFL   LUE Strength   LUE Overall Strength Within Functional Limits - able to perform ADL tasks with strength  (3/5 throughout)   Hand Function   Gross Motor Coordination Functional   Fine Motor Coordination Functional   Sensation   Light Touch No apparent deficits   Sharp/Dull No apparent deficits   Proprioception   Proprioception No apparent deficits   Vision-Basic Assessment   Current Vision Wears glasses only for reading  (however glasses not at hospital per pt report)   Vision - Complex Assessment   Ocular Range of Motion [facility]   Acuity Able to read employee name badge without difficulty   Additional Comments Able to read employee name accurately from name badge   Perception   Inattention/Neglect Appears intact   Cognition   Overall Cognitive Status Impaired   Arousal/Participation Alert; Cooperative   Attention Attends with cues to redirect   Orientation Level Oriented X4   Memory Decreased recall of precautions;Decreased recall of recent events;Decreased short term memory   Following Commands Follows one step commands with increased time or repetition   Comments Pt [place]; Pleasant and cooperative, however decreased insight into deficits; Please see below information on MOCA for further cognitive evaluation   Cognition Assessment Tools MOCA   Score 16   Assessment   Limitation Decreased ADL status; Decreased UE strength;Decreased Safe judgement during ADL;Decreased cognition;Decreased endurance;Decreased self-care trans;Decreased high-level ADLs   Prognosis Fair   Assessment Pt is a 80 y o  female seen for OT evaluation s/p adm to [address] on 9/11/2018 w/ palpitations and dx'd with Atrial fibrillation with rapid ventricular response  Comorbidities affecting pts functional performance include a significant PMH of DM and varicose veins  Pt with active OT orders and activity orders for Up with assistance  Pt lives with 2 nephews in a one level home with 4 CORNEL through back entrance   Pt reports family is able to assist, however pt (+) home alone at times  At baseline, pt was I w/ ADLs and functional mobility/transfers w/ use of SPC, required assist w/ IADLs (reports I w/ meal prep, assist w/ cleaning/laundry   ? reliability), (+) , and reports 0 falls PTA  Upon evaluation, pt currently requires Mod A for LB ADLs, Mod A for toileting, Min A for UB ADLs, Min A for grooming/eating, and Min A for functional mobility/transfers 2* the following deficits impacting occupational performance: weakness, decreased strength, decreased balance, decreased tolerance, impaired memory, impaired problem solving and decreased safety awareness  These impairments, as well at pts steps to enter environment, limited home support, difficulty performing ADLS, difficulty performing IADLS  and limited insight into deficits limit pts ability to safely engage in all baseline areas of occupation, including grooming, bathing, dressing, toileting, functional mobility/transfers, community mobility, house maintenance, meal prep, social participation  and leisure activities   Pt scored overall 45/100 on the Barthel Index  Based on the aforementioned OT evaluation, functional performance deficits, and assessments, pt has been identified as a moderate complexity evaluation  Pt to continue to benefit from continued acute OT services during hospital stay to address defined deficits and to maximize level of functional independence in the following Occupational Performance areas: grooming, bathing/shower, toilet hygiene, dressing, medication management, socialization, health maintenance, functional mobility, community mobility, clothing management, meal prep, household maintenance, social participation and transfers to common household surfaces  From OT standpoint, recommend STR upon D/C   OT will continue to follow pt 3-5x/wk to address the following goals to  w/in 7-10 days:   Goals   Patient Goals "for things to go back to the way they were"   LTG Time Frame 7-10   Long Term Goal Please refer to LTGs listed below   Plan   Treatment Interventions ADL retraining;Functional transfer training;UE strengthening/ROM; Endurance training;Cognitive reorientation;Patient/family training;Equipment evaluation/education; Compensatory technique education; Activityengagement; Energy conservation   Goal Expiration Date 09/21/18   Treatment Day 1   OT Frequency 3-5x/wk   Additional Treatment Session   Start Time 1340   End Time 1405   Treatment Assessment Pt seen for additional treatment session this PM focusing on completion of cognitive evaluation [facility] Cognitive Assessment)  Pt alert and cooperative throughout session  Pt seated OOB in chair at start of session  Pt scored an overall 16/30 with score breakdown as follows: VISUOSPATIAL/EXECUTIVE- 3/5; NAMING- 3/3; ATTENTION- 5/6; LANGUAGE- 0/3; ABSTRACTION- 1/2; DELAYED RECALL- 0/5; ORIENTATION- 4/6  Increased difficulty noted with recalling words after short delay, orientation to date and city, repeating verbal sentences to therapist, and sustained attention during tasks  Discussed results with pt with pt reporting increased difficulty with memory (ie: "Sometimes I leave the room and forget what I was doing or looking for")  Therefore, discussed strategies, such as writing down a list, to assist with STM deficits with pt reporting understanding of education  Pt seated OOB in chair at end of session with chair alarm activated  Call bell and phone within reach  All needs met and pt reports no further questions for OT at this time  OT to follow pt on caseload  Continue to recommend STR upon D/C  CM notified  OT to follow pt on caseload      Additional Treatment Day 1   Recommendation   OT Discharge Recommendation Short Term Rehab   OT - OK to Discharge Yes  (when medically cleared to rehab)   Barthel Index   Feeding 5   Bathing 0   Grooming Score 0   Dressing Score 5   Bladder Score 10   Bowels Score 10   Toilet Use Score 5   Transfers (Bed/Chair) Score 10   Mobility (Level Surface) Score 0   Stairs Score 0   Barthel Index Score 45   Modified Bishop Scale   Modified Bishop Scale 4        GOALS    1) Pt will improve activity tolerance to G for min 30 min txment sessions    2) Pt will complete bed mobility at a Mod I level w/ G balance/safety demonstrated to decrease safety awareness in home environment     3) Pt will complete UB/LB dressing/self care w/ mod I using adaptive device and DME as needed    4) Pt will complete bathing w/ Mod I w/ use of AE and DME as needed    5) Pt will complete toileting w/ mod I w/ G hygiene/thoroughness using DME as needed    6) Pt will improve functional transfers to Mod I on/off all surfaces using DME as needed w/ G balance/safety     7) Pt will improve functional mobility during ADL/IADL/leisure tasks to Mod I using DME as needed w/ G balance/safety     8) Pt will participate in simulated IADL management task (ie: meal prep) to increase independence to Mod I w/ G safety and endurance    9) Pt will engage in ongoing cognitive assessment w/ G participation w/ mod I to assist w/ safe d/c planning/recommendations    10) Pt will demonstrate G carryover of pt/caregiver education and training as appropriate w/ mod I w/o cues w/ good tolerance    11) Pt will demonstrate 100% carryover of energy conservation techniques w/ mod I t/o functional I/ADL/leisure tasks w/o cues s/p skilled education     12) Pt will increase BUE strength by 1 MM grade to increase independence in ADLs and transfers      Ignacio Whipple, OTR/L

## 2018-09-11 NOTE — PLAN OF CARE
Problem: OCCUPATIONAL THERAPY ADULT  Goal: Performs self-care activities at highest level of function for planned discharge setting  See evaluation for individualized goals  Treatment Interventions: ADL retraining, Functional transfer training, UE strengthening/ROM, Endurance training, Cognitive reorientation, Patient/family training, Equipment evaluation/education, Compensatory technique education, Activityengagement, Energy conservation          See flowsheet documentation for full assessment, interventions and recommendations  Limitation: Decreased ADL status, Decreased UE strength, Decreased Safe judgement during ADL, Decreased cognition, Decreased endurance, Decreased self-care trans, Decreased high-level ADLs  Prognosis: Fair  Assessment: Pt is a 80 y o  female seen for OT evaluation s/p adm to [address] on 9/11/2018 w/ palpitations and dx'd with Atrial fibrillation with rapid ventricular response  Comorbidities affecting pts functional performance include a significant PMH of DM and varicose veins  Pt with active OT orders and activity orders for Up with assistance  Pt lives with 2 nephews in a one level home with 4 CORNEL through back entrance  Pt reports family is able to assist, however pt (+) home alone at times  At baseline, pt was I w/ ADLs and functional mobility/transfers w/ use of SPC, required assist w/ IADLs (reports I w/ meal prep, assist w/ cleaning/laundry   ? reliability), (+) , and reports 0 falls PTA  Upon evaluation, pt currently requires Mod A for LB ADLs, Mod A for toileting, Min A for UB ADLs, Min A for grooming/eating, and Min A for functional mobility/transfers 2* the following deficits impacting occupational performance: weakness, decreased strength, decreased balance, decreased tolerance, impaired memory, impaired problem solving and decreased safety awareness   These impairments, as well at pts steps to enter environment, limited home support, difficulty performing ADLS, difficulty performing IADLS  and limited insight into deficits limit pts ability to safely engage in all baseline areas of occupation, including grooming, bathing, dressing, toileting, functional mobility/transfers, community mobility, house maintenance, meal prep, social participation  and leisure activities   Pt scored overall 45/100 on the Barthel Index  Based on the aforementioned OT evaluation, functional performance deficits, and assessments, pt has been identified as a moderate complexity evaluation  Pt to continue to benefit from continued acute OT services during hospital stay to address defined deficits and to maximize level of functional independence in the following Occupational Performance areas: grooming, bathing/shower, toilet hygiene, dressing, medication management, socialization, health maintenance, functional mobility, community mobility, clothing management, meal prep, household maintenance, social participation and transfers to common household surfaces  From OT standpoint, recommend STR upon D/C   OT will continue to follow pt 3-5x/wk to address the following goals to  w/in 7-10 days:     OT Discharge Recommendation: Short Term Rehab  OT - OK to Discharge: Yes (when medically cleared to rehab)

## 2018-09-11 NOTE — PHYSICAL THERAPY NOTE
Physical Therapy Evaluation     Patient's Name: Linda Tomas    Admitting Diagnosis  Palpitations [R00 2]  Atrial fibrillation with RVR ([facility] ) [I48 [id number]]    Problem List  Patient Active Problem List   Diagnosis    Atrial fibrillation with rapid ventricular response ([facility] )    Hyperlipidemia    Hypertension    Advanced age   [employer] Noncompliance with medication regimen    Fungal dermatitis       Past Medical History  Past Medical History:   Diagnosis Date    A-fib [facility])     last assessed 10/16/17    History of varicose veins        Past Surgical History  History reviewed  No pertinent surgical history  09/11/18 1157   Note Type   Note type Eval only   Pain Assessment   Pain Assessment No/denies pain   Home Living   Type of [address] One level  (4 CORNEL w/ hand rail)   Home Equipment Walker;Cane   Prior Function   Level of [place] Independent with ADLs and functional mobility  (amb w/ SPC;)   Lives With Family  (nephews)   Receives Help From Family   Restrictions/Precautions   Braces or Orthoses (denies)   Other Precautions Cognitive; Fall Risk;Telemetry;Cardiac/sternal;Bed Alarm  (chair alarm activated at the end of session)   General   Additional Pertinent History cleared for assessment (spoke to nsg)   Cognition   Overall Cognitive Status Impaired   Arousal/Participation Cooperative   Attention Attends with cues to redirect   Orientation Level Oriented to person;Oriented to situation   Memory Decreased recall of recent events;Decreased recall of precautions   Following Commands Follows one step commands with increased time or repetition   Comments Pt is observed in bed; mod [place]; pleasant and cooperative; generally disoriented; agreeable to mobilize   RUE Assessment   RUE Assessment WFL  (AROM)   RUE Strength   RUE Overall Strength Within Functional Limits - able to perform ADL tasks with strength   LUE Assessment   LUE Assessment WFL  (AROM)   LUE Strength   LUE Overall Strength Within Functional Limits - able to perform ADL tasks with strength   RLE Assessment   RLE Assessment WFL  (AROM)   Strength RLE   RLE Overall Strength (fair + (grossly))   LLE Assessment   LLE Assessment WFL  (AROM)   Strength LLE   LLE Overall Strength (fair + (grossly))   Bed Mobility   Supine to Sit 4  Minimal assistance   Additional items Assist x 1; Increased time required;Verbal cues   Transfers   Sit to Stand 4  Minimal assistance   Additional items Assist x 1;Verbal cues  (2 trials)   Stand to Sit 4  Minimal assistance   Additional items Assist x 1;Verbal cues  (2 trials)   Ambulation/Elevation   Gait pattern Excessively slow; Short stride; Inconsistent debbie   Gait Assistance 3  Moderate assist  (w/ SPC; min (A) w/ rw)   Additional items Verbal cues; Tactile cues   Assistive Device Straight cane;Rolling walker  (SPC --> rw)   Distance 2x 50 ft w/ seated rest period in between   Stair Management Assistance Not tested  (not appropriate at this time)   Balance   Static Sitting Fair -   Static Standing Poor +   Ambulatory Poor +  (w/ rw; poor w/ SPC)   Activity Tolerance   Activity Tolerance Patient limited by fatigue   Nurse Made Aware spoke to [address]   Assessment   Prognosis Good   Problem List Decreased strength;Decreased endurance; Impaired balance;Decreased mobility; Decreased cognition; Impaired hearing   Assessment Pt is 80 y o  female admitted with Dx of Atrial fibrillation with rapid ventricular response  Pt 's comorbidities affecting POC include: HTN, a-fib and personal factors of: advanced age and CORNEL  Pt's clinical presentation is currently unstable/unpredictable which is evident in abn lab values being monitored, ongoing telem monitoring while in a step down unit, need for input for task focus and mobility technique/safety and need for assist w/ all phases of mobility when usually mobilizing independently   Pt presents w/ generalized weakness, incl decreased LE strength, decreased functional endurance, impaired cognition, impaired balance and gait deviations (currently requires rw w/ (A) and fall risk  Will cont to follow pt in PT for progressive mobilization to address above functional deficits and to max level of (I), endurance, and safety  Currently recommend rehab upon D/C when medically cleared  Will cont to follow until then  Barriers to Discharge Inaccessible home environment   Goals   Patient Goals to get back to her activities   STG Expiration Date 09/21/18   Short Term Goal #1 7-10 days  Pt will amb 150 ft w/ rw <--> SPC, mod (I) in order to facilitate safe return to premorbid environment and at least household amb status  Pt will negotiate 4 steps w/ hand rail and SPC, (S)x1 in order to assure safe navigation in and out of the premorbid living environment  Pt will achieve (I) level w/ bed mob in order to facilitate safety with OOB and back to bed transitions in own living environment  Pt will perform transfers w/ mod (I) to assure (I) and safety w/ functional mobility/transitions w/ all aspects of mobility/locomotion  Pt will participate in LE therex and balance activities to max progression w/ mobility skills  Treatment Day 0   Plan   Treatment/Interventions Functional transfer training;LE strengthening/ROM; Elevations; Therapeutic exercise; Endurance training;Equipment eval/education; Bed mobility;Gait training;Spoke to nursing;OT   PT Frequency Other (Comment)  (3-5x/wk)   Recommendation   Recommendation Post acute IP rehab   Equipment Recommended Walker  (w/ (A))   Modified Healy Scale   Modified Healy Scale 4   Barthel Index   Feeding 5   Bathing 0   Grooming Score 0   Dressing Score 5   Bladder Score 10   Bowels Score 10   Toilet Use Score 5   Transfers (Bed/Chair) Score 10   Mobility (Level Surface) Score 0   Stairs Score 0   Barthel Index Score 45       Nathan Salgado, PT

## 2018-09-11 NOTE — MALNUTRITION/BMI
This medical record reflects one or more clinical indicators suggestive of malnutrition and/or morbid obesity  Malnutrition Findings:   Malnutrition type: Chronic illness  Degree of Malnutrition: Other severe protein calorie malnutrition  Malnutrition Characteristics: Fat loss, Muscle loss, Weight loss (Pt presents wth 12% involuntary wt loss in 11 months, severe clavicle protrusion and orbital hollowing, Treat with diet and supplements )    BMI Findings: Body mass index is 21 98 kg/m²  See Nutrition note dated 09/11/2018 for additional details  Completed nutrition assessment is viewable in the nutrition documentation
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
## 2018-09-11 NOTE — PLAN OF CARE
Problem: PHYSICAL THERAPY ADULT  Goal: Performs mobility at highest level of function for planned discharge setting  See evaluation for individualized goals  Treatment/Interventions: Functional transfer training, LE strengthening/ROM, Elevations, Therapeutic exercise, Endurance training, Equipment eval/education, Bed mobility, Gait training, Spoke to nursing, OT  Equipment Recommended: Laurie Hernandez (w/ (A))       See flowsheet documentation for full assessment, interventions and recommendations  Prognosis: Good  Problem List: Decreased strength, Decreased endurance, Impaired balance, Decreased mobility, Decreased cognition, Impaired hearing  Assessment: Pt is 80 y o  female admitted with Dx of Atrial fibrillation with rapid ventricular response  Pt 's comorbidities affecting POC include: HTN, a-fib and personal factors of: advanced age and CORNEL  Pt's clinical presentation is currently unstable/unpredictable which is evident in abn lab values being monitored, ongoing telem monitoring while in a step down unit, need for input for task focus and mobility technique/safety and need for assist w/ all phases of mobility when usually mobilizing independently  Pt presents w/ generalized weakness, incl decreased LE strength, decreased functional endurance, impaired cognition, impaired balance and gait deviations (currently requires rw w/ (A) and fall risk  Will cont to follow pt in PT for progressive mobilization to address above functional deficits and to max level of (I), endurance, and safety  Currently recommend rehab upon D/C when medically cleared  Will cont to follow until then  Barriers to Discharge: Inaccessible home environment     Recommendation: Post acute IP rehab          See flowsheet documentation for full assessment

## 2018-09-11 NOTE — ED CARE HANDOFF
Emergency Department Sign Out Note        Sign out and transfer of care from Dr Mickey Tinajero  See Separate Emergency Department note  The patient, Danielle Ortega, was evaluated by the previous provider for Palpitations  Workup Completed:   rapid atrial fib with unremarkable blood work    ED Course / Workup Pending (followup): Rate controlled with IV beta blocker will admit for further evaluation and  Case management consultation  Procedures  MDM  CritCare Time      Disposition  Final diagnoses:   Atrial fibrillation with RVR ([address] )     Time reflects when diagnosis was documented in both MDM as applicable and the Disposition within this note     Time User Action Codes Description Comment    9/11/2018  7:23 AM Chris Hernandez [I48 [id number]] Atrial fibrillation with RVR [facility])       ED Disposition     ED Disposition Condition Comment    Admit  Case was discussed with **Dr Everton Baldwin* and the patient's admission status was agreed to be Admission Status: inpatient status to the service of Dr Everton Baldwin   Follow-up Information    None       Patient's Medications   Discharge Prescriptions    No medications on file     No discharge procedures on file         ED Provider  Electronically Signed by     Cami Braun DO  09/11/18 8032

## 2018-09-12 PROBLEM — I50.32 CHRONIC DIASTOLIC CONGESTIVE HEART FAILURE (HCC): Status: ACTIVE | Noted: 2018-09-12

## 2018-09-12 PROBLEM — E43 SEVERE PROTEIN-CALORIE MALNUTRITION (HCC): Status: ACTIVE | Noted: 2018-09-12

## 2018-09-12 PROBLEM — I21.4 NON-ST ELEVATION MYOCARDIAL INFARCTION (NSTEMI) (HCC): Status: ACTIVE | Noted: 2018-09-12

## 2018-09-12 LAB
ANION GAP SERPL CALCULATED.3IONS-SCNC: 6 MMOL/L (ref 4–13)
BUN SERPL-MCNC: 23 MG/DL (ref 5–25)
CALCIUM SERPL-MCNC: 9.1 MG/DL (ref 8.3–10.1)
CHLORIDE SERPL-SCNC: 108 MMOL/L (ref 100–108)
CHOLEST SERPL-MCNC: 152 MG/DL (ref 50–200)
CO2 SERPL-SCNC: 28 MMOL/L (ref 21–32)
CREAT SERPL-MCNC: 0.48 MG/DL (ref 0.6–1.3)
ERYTHROCYTE [DISTWIDTH] IN BLOOD BY AUTOMATED COUNT: 14.2 % (ref 11.6–15.1)
GFR SERPL CREATININE-BSD FRML MDRD: 85 ML/MIN/1.73SQ M
GLUCOSE SERPL-MCNC: 97 MG/DL (ref 65–140)
HCT VFR BLD AUTO: 43.4 % (ref 34.8–46.1)
HDLC SERPL-MCNC: 65 MG/DL (ref 40–60)
HGB BLD-MCNC: 13.6 G/DL (ref 11.5–15.4)
LDLC SERPL CALC-MCNC: 70 MG/DL (ref 0–100)
MCH RBC QN AUTO: 29.4 PG (ref 26.8–34.3)
MCHC RBC AUTO-ENTMCNC: 31.3 G/DL (ref 31.4–37.4)
MCV RBC AUTO: 94 FL (ref 82–98)
NONHDLC SERPL-MCNC: 87 MG/DL
PLATELET # BLD AUTO: 223 THOUSANDS/UL (ref 149–390)
PMV BLD AUTO: 11 FL (ref 8.9–12.7)
POTASSIUM SERPL-SCNC: 4.1 MMOL/L (ref 3.5–5.3)
RBC # BLD AUTO: 4.63 MILLION/UL (ref 3.81–5.12)
SODIUM SERPL-SCNC: 142 MMOL/L (ref 136–145)
TRIGL SERPL-MCNC: 84 MG/DL
WBC # BLD AUTO: 8.8 THOUSAND/UL (ref 4.31–10.16)

## 2018-09-12 PROCEDURE — 99233 SBSQ HOSP IP/OBS HIGH 50: CPT | Performed by: INTERNAL MEDICINE

## 2018-09-12 PROCEDURE — 80048 BASIC METABOLIC PNL TOTAL CA: CPT | Performed by: INTERNAL MEDICINE

## 2018-09-12 PROCEDURE — 85027 COMPLETE CBC AUTOMATED: CPT | Performed by: INTERNAL MEDICINE

## 2018-09-12 PROCEDURE — 80061 LIPID PANEL: CPT | Performed by: INTERNAL MEDICINE

## 2018-09-12 RX ORDER — FUROSEMIDE 20 MG/1
20 TABLET ORAL DAILY
Status: DISCONTINUED | OUTPATIENT
Start: 2018-09-12 | End: 2018-09-14 | Stop reason: HOSPADM

## 2018-09-12 RX ADMIN — LISINOPRIL 20 MG: 20 TABLET ORAL at 08:59

## 2018-09-12 RX ADMIN — ENOXAPARIN SODIUM 30 MG: 30 INJECTION SUBCUTANEOUS at 08:59

## 2018-09-12 RX ADMIN — METOPROLOL TARTRATE 50 MG: 50 TABLET ORAL at 17:59

## 2018-09-12 RX ADMIN — BIMATOPROST 1 DROP: 0.1 SOLUTION/ DROPS OPHTHALMIC at 21:35

## 2018-09-12 RX ADMIN — METOPROLOL TARTRATE 50 MG: 50 TABLET ORAL at 08:59

## 2018-09-12 RX ADMIN — DORZOLAMIDE HYDROCHLORIDE AND TIMOLOL MALEATE 1 DROP: 20; 5 SOLUTION/ DROPS OPHTHALMIC at 09:00

## 2018-09-12 RX ADMIN — ASPIRIN 81 MG: 81 TABLET, COATED ORAL at 08:59

## 2018-09-12 RX ADMIN — NYSTATIN: 100000 POWDER TOPICAL at 09:00

## 2018-09-12 RX ADMIN — NYSTATIN: 100000 POWDER TOPICAL at 17:56

## 2018-09-12 RX ADMIN — DOCUSATE SODIUM 100 MG: 100 CAPSULE, LIQUID FILLED ORAL at 17:59

## 2018-09-12 RX ADMIN — EZETIMIBE 10 MG: 10 TABLET ORAL at 09:00

## 2018-09-12 RX ADMIN — DORZOLAMIDE HYDROCHLORIDE AND TIMOLOL MALEATE 1 DROP: 20; 5 SOLUTION/ DROPS OPHTHALMIC at 17:56

## 2018-09-12 RX ADMIN — FUROSEMIDE 20 MG: 20 TABLET ORAL at 12:38

## 2018-09-12 NOTE — PLAN OF CARE
CARDIOVASCULAR - ADULT     Absence of cardiac dysrhythmias or at baseline rhythm Progressing        DISCHARGE PLANNING     Discharge to home or other facility with appropriate resources Progressing        DISCHARGE PLANNING - CARE MANAGEMENT     Discharge to post-acute care or home with appropriate resources Progressing        INFECTION - ADULT     Absence or prevention of progression during hospitalization Progressing        Knowledge Deficit     Patient/family/caregiver demonstrates understanding of disease process, treatment plan, medications, and discharge instructions Progressing        METABOLIC, FLUID AND ELECTROLYTES - ADULT     Electrolytes maintained within normal limits Progressing        MUSCULOSKELETAL - ADULT     Maintain or return mobility to safest level of function Progressing        PAIN - ADULT     Verbalizes/displays adequate comfort level or baseline comfort level Progressing        Prexisting or High Potential for Compromised Skin Integrity     Skin integrity is maintained or improved Progressing        SAFETY ADULT     Patient will remain free of falls Progressing

## 2018-09-12 NOTE — ASSESSMENT & PLAN NOTE
Ejection fraction is normal on echo yesterday but patient has diastolic dysfunction  She has lower extremity edema  Will add Lasix 20 mg in the morning to lisinopril metoprolol    Will monitor patient's renal function and electrolytes with serial blood work

## 2018-09-12 NOTE — ASSESSMENT & PLAN NOTE
Malnutrition Findings:   Malnutrition type: Chronic illness  Degree of Malnutrition: Other severe protein calorie malnutrition    BMI Findings: Body mass index is 21 75 kg/m²

## 2018-09-12 NOTE — PROGRESS NOTES
Progress Note - Nisha Kline 1/9/1926, 80 y o  female MRN: [id number]    Unit/Bed#: -01 Encounter: [id number]    Primary Care Provider: Rosaura Rangel MD   Date and time admitted to hospital: 9/11/2018  6:09 AM        * Atrial fibrillation with rapid ventricular response [facility])   Assessment & Plan    Patient admitted with rapid atrial fibrillation  This morning she seems to have converted to normal sinus rhythm, she has sinus bradycardia with heart rates in the high 50s on the monitor  Will continue p o  Lopressor 50 mg p o  B i d  will get her out of bed, will ask Physical Occupational therapy to work with her, will transfer to [facility]        Essential hypertension   Assessment & Plan    Blood pressure is 426 systolic this morning, will continue metoprolol, lisinopril and will add Lasix        Chronic diastolic congestive heart failure (HCC)   Assessment & Plan    Ejection fraction is normal on echo yesterday but patient has diastolic dysfunction  She has lower extremity edema  Will add Lasix 20 mg in the morning to lisinopril metoprolol  Will monitor patient's renal function and electrolytes with serial blood work        Non-ST elevation myocardial infarction (NSTEMI) ([facility] [address] )   Assessment & Plan    Patient's troponin increased to 0 15  This is a type 2 non ST elevation myocardial infarction in the setting of rapid atrial fibrillation  Will continue aspirin and metoprolol        Other hyperlipidemia   Assessment & Plan    Continue Zetia            VTE Prophylaxis: in place    Patient Centered Rounds: I rounded with patient's nurse    Current Length of Stay: 1 day(s)    Current Patient Status: Inpatient    Certification Statement: Pt requires additional inpatient hospital stay due to: see assessment and plan        Subjective:   Patient's nurse reports that she had an uneventful night    Patient this morning denies any chest pain, shortness of breath, wheezing, nausea, abdominal pain, palpitations, dysuria, signs of bleeding  All other ROS are negative    Objective:     Vitals:   Temp (24hrs), Av °F (36 7 °C), Min:97 6 °F (36 4 °C), Max:98 7 °F (37 1 °C)    HR:  [56-73] 60  Resp:  [16-18] 18  BP: (139-155)/(61-67) 149/67  SpO2:  [96 %-98 %] 97 %  Body mass index is 21 75 kg/m²  Input and Output Summary (last 24 hours): Intake/Output Summary (Last 24 hours) at 18 09  Last data filed at 18 0549   Gross per 24 hour   Intake              970 ml   Output             1050 ml   Net              -80 ml       Physical Exam:     Physical Exam   Constitutional: She appears well-developed  No distress  HENT:   Head: Normocephalic  Mouth/Throat: Oropharynx is clear and moist    Eyes: Conjunctivae are normal    Neck: Neck supple  Cardiovascular: Regular rhythm  Bradycardic with heart rates in the high 50s on the monitor  She has 1+ bilateral lower leg edema up to the knees   Pulmonary/Chest: Effort normal  No respiratory distress  She has no wheezes  She has no rales  I heard no crackles or wheezing   Abdominal: Soft  Bowel sounds are normal  She exhibits no distension  There is no tenderness  Musculoskeletal: She exhibits no tenderness  Lymphadenopathy:     She has no cervical adenopathy  Neurological: She is alert  No cranial nerve deficit  Skin: Skin is warm and dry  No rash noted  Psychiatric: She has a normal mood and affect  Vitals reviewed  I personally reviewed labs and imaging reports for today        Last 24 Hours Medication List:     Current Facility-Administered Medications:  acetaminophen 650 mg Oral Q6H PRN Jessica Fly, DO   aspirin 81 mg Oral Daily Jessica Fly, DO   bimatoprost 1 drop Both Eyes HS Jessica Fly, DO   docusate sodium 100 mg Oral BID Jessica Fly, DO   dorzolamide-timolol 1 drop Both Eyes BID Jessica Fly, DO   enoxaparin 30 mg Subcutaneous Daily Jessica Fly, DO   ezetimibe 10 mg Oral Daily Jessica Fly, DO   furosemide 20 mg Oral Daily Tessy Garcia MD Mason   lisinopril 20 mg Oral Daily Jessica Fly, DO   metoprolol tartrate 50 mg Oral BID Jessica Fly, DO   nystatin  Topical BID Jessica Fly, DO   ondansetron 4 mg Intravenous Q6H PRN Jonas Silva DO          Today, Patient Was Seen By: Liam Grey MD    ** Please Note: Dictation voice to text software may have been used in the creation of this document   **

## 2018-09-12 NOTE — ASSESSMENT & PLAN NOTE
Patient's troponin increased to 0 15  This is a type 2 non ST elevation myocardial infarction in the setting of rapid atrial fibrillation    Will continue aspirin and metoprolol

## 2018-09-12 NOTE — PHYSICIAN ADVISOR
Current patient class: Inpatient  The patient is currently on Hospital Day: 2 at [place] [address]      The patient was admitted to the hospital at [facility] on 9/11/18 for the following diagnosis:  Palpitations [R00 2]  Atrial fibrillation with RVR ([address] ) [I48 91]       There is documentation in the medical record of an expected length of stay of at least 2 midnights  The patient is therefore expected to satisfy the 2 midnight benchmark and given the 2 midnight presumption is appropriate for INPATIENT ADMISSION  Given this expectation of a satisfying stay, CMS instructs us that the patient is most often appropriate for inpatient admission under part A provided medical necessity is documented in the chart  After review of the relevant documentation, labs, vital signs and test results, the patient is appropriate for INPATIENT ADMISSION  Admission to the hospital as an inpatient is a complex decision making process which requires the practitioner to consider the patients presenting complaint, history and physical examination and all relevant testing  With this in mind, in this case, the patient was deemed appropriate for INPATIENT ADMISSION  After review of the documentation and testing available at the time of the admission I concur with this clinical determination of medical necessity  Rationale is as follows:     Patient is a 41-year-old female presented to the emergency room on 09/11/2018 with a chief complaint of palpitations  She was found to be in atrial fibrillation with rapid heart rate in the 130s  She received intravenous metoprolol with subsequent later conversion to sinus rhythm  Today her heart rates were in the 50s and low 60s  Her oral medications including metoprolol are being adjusted  She was found to have elevated troponin levels which are still trending up  This is considered non ST elevation MI type 2  Patient also had lower extremity edema on admission  Lasix was added for treatment  Patient is noted to have severe protein calorie malnutrition  She is not safe for discharge in light of the above findings  She will remain hospitalized for over 2 midnights  Inpatient hospitalization is warranted for continued evaluation and treatment  The patients vitals on arrival were ED Triage Vitals [09/11/18 0620]   Temperature Pulse Respirations Blood Pressure SpO2   (!) 97 °F (36 1 °C) (!) 135 16 (!) 172/82 98 %      Temp Source Heart Rate Source Patient Position - Orthostatic VS BP Location FiO2 (%)   Temporal Monitor Lying Left arm --      Pain Score       No Pain           Past Medical History:   Diagnosis Date    A-fib [facility])     last assessed 10/16/17    History of varicose veins      History reviewed  No pertinent surgical history  Consults have been placed to:   IP CONSULT TO CASE MANAGEMENT  IP CONSULT TO CASE MANAGEMENT    Vitals:    09/12/18 0400 09/12/18 0548 09/12/18 1058 09/12/18 1515   BP: 149/67  162/68 151/67   BP Location: Right arm  Left arm    Pulse: 60  (!) 52 60   Resp: 18  18 20   Temp: 97 6 °F (36 4 °C)  97 8 °F (36 6 °C) 98 °F (36 7 °C)   TempSrc: Oral  Oral Oral   SpO2: 97%  97% 97%   Weight:  53 9 kg (118 lb 14 4 oz)     Height:           Most recent labs:    Recent Labs      09/11/18   0635   09/11/18   1141  09/12/18   0532  09/12/18   0632   WBC  8 08   --    --    --   8 80   HGB  13 9   --    --    --   13 6   HCT  43 1   --    --    --   43 4   PLT  224   --    --    --   223   K  3 6   --    --   4 1   --    NA  143   --    --   142   --    CALCIUM  9 6   --    --   9 1   --    BUN  23   --    --   23   --    CREATININE  0 71   --    --   0 48*   --    INR  1 06   --    --    --    --    TROPONINI  <0 02   < >  0 15*   --    --    AST  14   --    --    --    --    ALT  18   --    --    --    --    ALKPHOS  97   --    --    --    --     < > = values in this interval not displayed         Scheduled Meds:  Current Facility-Administered Medications:  acetaminophen 650 mg Oral Q6H PRN Jessica Fly, DO   aspirin 81 mg Oral Daily Jessica Fly, DO   bimatoprost 1 drop Both Eyes HS Jessica Fly, DO   docusate sodium 100 mg Oral BID Jessica Fly, DO   dorzolamide-timolol 1 drop Both Eyes BID Jessica Fly, DO   enoxaparin 30 mg Subcutaneous Daily Jessica Fly, DO   ezetimibe 10 mg Oral Daily Jessica Fly, DO   furosemide 20 mg Oral Daily Alber Obrien MD   lisinopril 20 mg Oral Daily Jessica Fly, DO   metoprolol tartrate 50 mg Oral BID Jessica Fly, DO   nystatin  Topical BID Jessica Fly, DO   ondansetron 4 mg Intravenous Q6H PRN Jessica Fly, DO     Continuous Infusions:   PRN Meds:   acetaminophen    ondansetron    Surgical procedures (if appropriate):

## 2018-09-12 NOTE — ASSESSMENT & PLAN NOTE
Blood pressure is 272 systolic this morning, will continue metoprolol, lisinopril and will add Lasix
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
## 2018-09-12 NOTE — ASSESSMENT & PLAN NOTE
Patient admitted with rapid atrial fibrillation  This morning she seems to have converted to normal sinus rhythm, she has sinus bradycardia with heart rates in the high 50s on the monitor  Will continue p o   Lopressor 50 mg p o  B i d  will get her out of bed, will ask Physical Occupational therapy to work with her, will transfer to [facility]

## 2018-09-12 NOTE — PROGRESS NOTES
Patient has severe protein calorie malnutrition in the setting pf advanced age, evidenced by 12% involuntary wt loss in 11 months, severe clavicle protrusion and orbital hollowing treated with diet and supplements and nutrition consult

## 2018-09-13 LAB
ANION GAP SERPL CALCULATED.3IONS-SCNC: 4 MMOL/L (ref 4–13)
BUN SERPL-MCNC: 16 MG/DL (ref 5–25)
CALCIUM SERPL-MCNC: 9.1 MG/DL (ref 8.3–10.1)
CHLORIDE SERPL-SCNC: 105 MMOL/L (ref 100–108)
CO2 SERPL-SCNC: 32 MMOL/L (ref 21–32)
CREAT SERPL-MCNC: 0.59 MG/DL (ref 0.6–1.3)
GFR SERPL CREATININE-BSD FRML MDRD: 80 ML/MIN/1.73SQ M
GLUCOSE SERPL-MCNC: 98 MG/DL (ref 65–140)
POTASSIUM SERPL-SCNC: 4 MMOL/L (ref 3.5–5.3)
SODIUM SERPL-SCNC: 141 MMOL/L (ref 136–145)

## 2018-09-13 PROCEDURE — 94760 N-INVAS EAR/PLS OXIMETRY 1: CPT

## 2018-09-13 PROCEDURE — 80048 BASIC METABOLIC PNL TOTAL CA: CPT | Performed by: INTERNAL MEDICINE

## 2018-09-13 PROCEDURE — 97110 THERAPEUTIC EXERCISES: CPT

## 2018-09-13 PROCEDURE — 99232 SBSQ HOSP IP/OBS MODERATE 35: CPT | Performed by: INTERNAL MEDICINE

## 2018-09-13 PROCEDURE — 97530 THERAPEUTIC ACTIVITIES: CPT

## 2018-09-13 PROCEDURE — 97116 GAIT TRAINING THERAPY: CPT

## 2018-09-13 RX ADMIN — FUROSEMIDE 20 MG: 20 TABLET ORAL at 08:21

## 2018-09-13 RX ADMIN — METOPROLOL TARTRATE 50 MG: 50 TABLET ORAL at 17:36

## 2018-09-13 RX ADMIN — LISINOPRIL 20 MG: 20 TABLET ORAL at 08:21

## 2018-09-13 RX ADMIN — DOCUSATE SODIUM 100 MG: 100 CAPSULE, LIQUID FILLED ORAL at 17:36

## 2018-09-13 RX ADMIN — NYSTATIN: 100000 POWDER TOPICAL at 17:36

## 2018-09-13 RX ADMIN — DOCUSATE SODIUM 100 MG: 100 CAPSULE, LIQUID FILLED ORAL at 08:21

## 2018-09-13 RX ADMIN — METOPROLOL TARTRATE 50 MG: 50 TABLET ORAL at 08:21

## 2018-09-13 RX ADMIN — DORZOLAMIDE HYDROCHLORIDE AND TIMOLOL MALEATE 1 DROP: 20; 5 SOLUTION/ DROPS OPHTHALMIC at 17:36

## 2018-09-13 RX ADMIN — ENOXAPARIN SODIUM 30 MG: 30 INJECTION SUBCUTANEOUS at 08:21

## 2018-09-13 RX ADMIN — EZETIMIBE 10 MG: 10 TABLET ORAL at 08:21

## 2018-09-13 RX ADMIN — ASPIRIN 81 MG: 81 TABLET, COATED ORAL at 08:21

## 2018-09-13 RX ADMIN — BIMATOPROST 1 DROP: 0.1 SOLUTION/ DROPS OPHTHALMIC at 21:00

## 2018-09-13 RX ADMIN — DORZOLAMIDE HYDROCHLORIDE AND TIMOLOL MALEATE 1 DROP: 20; 5 SOLUTION/ DROPS OPHTHALMIC at 08:22

## 2018-09-13 RX ADMIN — NYSTATIN: 100000 POWDER TOPICAL at 08:22

## 2018-09-13 NOTE — PLAN OF CARE
Problem: PHYSICAL THERAPY ADULT  Goal: Performs mobility at highest level of function for planned discharge setting  See evaluation for individualized goals  Treatment/Interventions: Functional transfer training, LE strengthening/ROM, Elevations, Therapeutic exercise, Endurance training, Equipment eval/education, Bed mobility, Gait training, Spoke to nursing, OT  Equipment Recommended: Zuleika Aranda (w/ (A))       See flowsheet documentation for full assessment, interventions and recommendations  Outcome: Progressing  Prognosis: Fair  Problem List: Decreased strength, Decreased range of motion, Decreased endurance, Decreased mobility, Impaired balance, Decreased cognition, Impaired judgement, Decreased safety awareness, Decreased skin integrity, Impaired hearing  Assessment: Pt  seated in bedside chair upon my arrival  Performance of HEP seated in bedside chair with A of therapist provided for proper completion  Progressed with transfers continuing to require A of therapist with cues provided for hand placement/technique  Pt  wished to attempt amb  [place] [facility] able to amb  with [facility] however required modA of therapist to overt LOB/increased lateral sway  Issued bariatric RW (unable to find standard RW in equipment rooms, would benefit from standard RW ) Progressed with a second amb  trial requiring Macie of therapist  Jamas Channel limited by fatigue  Returned to room and repositioned seated in bedside chair with alarm active at end of treatment session  PT will continue to recommend rehab upon d/c for continued improvement of noted impairments above  Barriers to Discharge: Inaccessible home environment  Barriers to Discharge Comments: CORNEL  Recommendation: Other (Comment) (rehab)     PT - OK to Discharge: Yes (if d/c to rehab when medically stable )    See flowsheet documentation for full assessment

## 2018-09-13 NOTE — PHYSICAL THERAPY NOTE
Physical Therapy Progress Note     09/13/18 1408   Pain Assessment   Pain Assessment No/denies pain   Pain Score No Pain   Restrictions/Precautions   Weight Bearing Precautions Per Order No   Other Precautions Cognitive; Chair Alarm; Bed Alarm; Fall Risk;Hard of hearing   General   Chart Reviewed Yes   Response to Previous Treatment Patient reporting fatigue but able to participate  Family/Caregiver Present No   Subjective   Subjective Willing to participate in therapy this PM    Transfers   Sit to Stand 4  Minimal assistance   Additional items Assist x 1; Armrests; Increased time required;Verbal cues   Stand to Sit 4  Minimal assistance   Additional items Assist x 1; Armrests; Increased time required;Verbal cues   Ambulation/Elevation   Gait pattern Decreased foot clearance; Short stride; Step to;Excessively slow; Shuffling   Gait Assistance 3  Moderate assist  (mod A with SPC, Macie with RW)   Additional items Assist x 1;Verbal cues; Tactile cues   Assistive Device SPC; Bariatric Rolling walker  (Trialed SPC, progression to bariatric RW )   Distance 60' x 1 with SPC, 60' x 1 with RW seated rest in between   Balance   Static Sitting Fair +   Dynamic Sitting Fair   Static Standing Fair -   Dynamic Standing Poor +   Ambulatory Poor +   Endurance Deficit   Endurance Deficit Yes   Endurance Deficit Description fatigue   Activity Tolerance   Activity Tolerance Patient limited by fatigue   Nurse Made Aware Yes   Exercises   TKR Sitting;10 reps;AAROM; Bilateral   Assessment   Prognosis Fair   Problem List Decreased strength;Decreased range of motion;Decreased endurance;Decreased mobility; Impaired balance;Decreased cognition; Impaired judgement;Decreased safety awareness;Decreased skin integrity; Impaired hearing   Assessment Pt  seated in bedside chair upon my arrival  Performance of HEP seated in bedside chair with A of therapist provided for proper completion   Progressed with transfers continuing to require A of therapist with cues provided for hand placement/technique  Pt  wished to attempt amb  trial with SPC, able to amb  with [facility] however required modA of therapist to overt LOB/increased lateral sway  Issued bariatric RW (unable to find standard RW in equipment rooms, would benefit from standard RW ) Progressed with a second amb  trial requiring Macie of therapist  Irais Ayala limited by fatigue  Returned to room and repositioned seated in bedside chair with alarm active at end of treatment session  PT will continue to recommend rehab upon d/c for continued improvement of noted impairments above  Barriers to Discharge Inaccessible home environment   Barriers to Discharge Comments CORNEL   Goals   Patient Goals To get better  STG Expiration Date 09/21/18   Treatment Day 1   Plan   Treatment/Interventions Functional transfer training;LE strengthening/ROM; Therapeutic exercise; Endurance training;Gait training;Spoke to nursing;Spoke to case management   Progress Progressing toward goals   PT Frequency Other (Comment)  (3-5x/wk)   Recommendation   Recommendation Other (Comment)  (rehab)   Equipment Recommended Walker  (RW)   PT - OK to Discharge Yes  (if d/c to rehab when medically stable )     Celsa Cortez, PTA

## 2018-09-13 NOTE — PROGRESS NOTES
Progress Note - Ankit Tomas 80 y o  female MRN: [id number]    Unit/Bed#: [address] [place] [address] [id number] Encounter: [id number]      Assessment:  Principal Problem:    Atrial fibrillation with rapid ventricular response (HCC)  Active Problems:    Noncompliance with medication regimen    Non-ST elevation myocardial infarction (NSTEMI) ([facility] )    Other hyperlipidemia    Essential hypertension    Chronic diastolic congestive heart failure ([facility] )    Advanced age    Fungal dermatitis    Severe protein-calorie malnutrition ([facility] )  Resolved Problems:    * No resolved hospital problems  *        Plan:  · Atrial fibrillation-remains in normal sinus rhythm with no signs of worsening CHF-will increase activity as tolerated and plan for discharge tomorrow to SNF  This was triggered by noncompliance with meds prior to admission  · Non STEMI type 2 likely due to stress with rapid AFib no signs of acute ischemia  · Chronic diastolic CHF-continue on diuretics-increase activity and assess O2 sats    Subjective:   No complaints of lightheadedness or dizziness  No chest pain or shortness of breath  ROS  Comprehensive system review negative other than noted above    Objective:     Vitals: Blood pressure 145/67, pulse 65, temperature 97 8 °F (36 6 °C), resp  rate 18, height 5' 2" (1 575 m), weight 53 4 kg (117 lb 11 6 oz), SpO2 96 %  ,Body mass index is 21 53 kg/m²    Current Facility-Administered Medications   Medication Dose Route Frequency Provider Last Rate Last Dose    acetaminophen (TYLENOL) tablet 650 mg  650 mg Oral Q6H PRN Tomas Jordan, DO        aspirin (ECOTRIN LOW STRENGTH) EC tablet 81 mg  81 mg Oral Daily Jessica Fly, DO   81 mg at 09/13/18 0821    bimatoprost (LUMIGAN) 0 01 % ophthalmic solution 1 drop  1 drop Both Eyes HS Jessica Fly, DO   1 drop at 09/12/18 2135    docusate sodium (COLACE) capsule 100 mg  100 mg Oral BID Jessica Fly, DO   100 mg at 09/13/18 0821    dorzolamide-timolol (COSOPT) 22 3-6 8 MG/ML ophthalmic solution 1 drop  1 drop Both Eyes BID Abdon Hang, DO   1 drop at 09/13/18 8358    enoxaparin (LOVENOX) subcutaneous injection 30 mg  30 mg Subcutaneous Daily Jessica Fly, DO   30 mg at 09/13/18 0596    ezetimibe (ZETIA) tablet 10 mg  10 mg Oral Daily Jessica Fly, DO   10 mg at 09/13/18 1612    furosemide (LASIX) tablet 20 mg  20 mg Oral Daily Mike Javier MD   20 mg at 09/13/18 0821    lisinopril (ZESTRIL) tablet 20 mg  20 mg Oral Daily Jessica Fly, DO   20 mg at 09/13/18 5108    metoprolol tartrate (LOPRESSOR) tablet 50 mg  50 mg Oral BID Jessica Fly, DO   50 mg at 09/13/18 5787    nystatin (MYCOSTATIN) powder   Topical BID Jessica Fly, DO        ondansetron (ZOFRAN) injection 4 mg  4 mg Intravenous Q6H PRN Abdon Hang, DO         Prescriptions Prior to Admission   Medication    bimatoprost (LUMIGAN) 0 01 % ophthalmic drops    aspirin 81 MG tablet    dorzolamide-timolol (COSOPT) 22 3-6 8 MG/ML ophthalmic solution    ezetimibe (ZETIA) 10 mg tablet    lisinopril (ZESTRIL) 20 mg tablet    metoprolol tartrate (LOPRESSOR) 50 mg tablet         Intake/Output Summary (Last 24 hours) at 09/13/18 1040  Last data filed at 09/13/18 9207   Gross per 24 hour   Intake                0 ml   Output              925 ml   Net             -925 ml       Physical Exam:  General appearance: alert  Neck: no adenopathy, no JVD, supple, symmetrical, trachea midline and thyroid not enlarged, symmetric, no tenderness/mass/nodules  Lungs: clear to auscultation bilaterally  Heart: regular rate and rhythm  Abdomen: soft, non-tender; bowel sounds normal; no masses,  no organomegaly  Extremities: extremities normal, warm and well-perfused; no cyanosis, clubbing, or edema  Skin: Skin color, texture, turgor normal  No rashes or lesions  Neurologic:  Generalized weakness  No focal motor deficits mild speech hesitancy on answering questions       Lab, Imaging and other studies: I have personally reviewed pertinent reports              Results from last 7 days  Lab Units 09/12/18  0632 09/11/18  0635   WBC Thousand/uL 8 80 8 08   HEMOGLOBIN g/dL 13 6 13 9   HEMATOCRIT % 43 4 43 1   PLATELETS Thousands/uL 223 224   NEUTROS PCT %  --  63   LYMPHS PCT %  --  22   MONOS PCT %  --  11   EOS PCT %  --  2       Results from last 7 days  Lab Units 09/13/18  0502 09/12/18  0532 09/11/18  0635   SODIUM mmol/L 141 142 143   POTASSIUM mmol/L 4 0 4 1 3 6   CHLORIDE mmol/L 105 108 104   CO2 mmol/L 32 28 28   BUN mg/dL 16 23 23   CREATININE mg/dL 0 59* 0 48* 0 71   CALCIUM mg/dL 9 1 9 1 9 6   ALK PHOS U/L  --   --  97   ALT U/L  --   --  18   AST U/L  --   --  14     Lab Results   Component Value Date    TROPONINI 0 15 (H) 09/11/2018    TROPONINI 0 11 (H) 09/11/2018    TROPONINI <0 02 09/11/2018       Results from last 7 days  Lab Units 09/11/18  0635   INR  1 06     No results found for: Maddie Andrew, SPUTUMCULTUR    Imaging:  Results for orders placed during the hospital encounter of 09/11/18   XR chest 1 view portable    Narrative CHEST     INDICATION:   sob  COMPARISON:  1/11/2015    EXAM PERFORMED/VIEWS:  XR CHEST PORTABLE      FINDINGS:    Cardiomediastinal silhouette appears unremarkable  The lungs are clear  No pneumothorax or pleural effusion  Osseous structures appear within normal limits for patient age  Levoscoliosis of the thoracolumbar junction  Impression No acute cardiopulmonary disease  Workstation performed: [id number]       Results for orders placed in visit on 01/11/15   XR chest pa & lateral    Narrative CHEST   INDICATION- Back pain  Fever  Body aches  COMPARISON- 10/19/2013  VIEWS-  Frontal and lateral projections    2 images   FINDINGS-   Heart shadow is enlarged but stable  There is air-filled distention of the esophagus  Emphysematous changes   are noted  No focal airspace opacity to suggest pneumonia  No   pneumothorax or pleural effusion     The visualized osseous structures appear within normal limits for the   patient's age    IMPRESSION-   Emphysema  No evidence of pneumonia  Nonspecific air filled distention of the esophagus  Transcribed on- [address], RAD MD        Reading Radiologist- SAWYER Gillespie MD        Electronically SAWYER Fitch MD        Released Date Time- 01/11/15 1531      ------------------------------------------------------------------------------   [address] [place] [address]        PATIENT CENTERED ROUNDS: I have performed rounds with the nursing staff            Davion Birmingham, DO

## 2018-09-14 VITALS
HEART RATE: 54 BPM | HEIGHT: 62 IN | RESPIRATION RATE: 16 BRPM | DIASTOLIC BLOOD PRESSURE: 63 MMHG | SYSTOLIC BLOOD PRESSURE: 139 MMHG | OXYGEN SATURATION: 97 % | TEMPERATURE: 98.6 F | BODY MASS INDEX: 21.58 KG/M2 | WEIGHT: 117.28 LBS

## 2018-09-14 PROBLEM — I48.91 ATRIAL FIBRILLATION WITH RAPID VENTRICULAR RESPONSE (HCC): Status: RESOLVED | Noted: 2018-09-11 | Resolved: 2018-09-14

## 2018-09-14 PROCEDURE — 99238 HOSP IP/OBS DSCHRG MGMT 30/<: CPT | Performed by: INTERNAL MEDICINE

## 2018-09-14 RX ORDER — FUROSEMIDE 20 MG/1
20 TABLET ORAL DAILY
Refills: 0
Start: 2018-09-14 | End: 2018-10-31

## 2018-09-14 RX ORDER — NYSTATIN 100000 [USP'U]/G
POWDER TOPICAL 2 TIMES DAILY
Qty: 15 G | Refills: 0 | Status: SHIPPED | OUTPATIENT
Start: 2018-09-14 | End: 2018-10-24 | Stop reason: HOSPADM

## 2018-09-14 RX ORDER — DOCUSATE SODIUM 100 MG/1
100 CAPSULE, LIQUID FILLED ORAL 2 TIMES DAILY
Qty: 10 CAPSULE | Refills: 0 | Status: SHIPPED | OUTPATIENT
Start: 2018-09-14

## 2018-09-14 RX ADMIN — ASPIRIN 81 MG: 81 TABLET, COATED ORAL at 09:55

## 2018-09-14 RX ADMIN — ENOXAPARIN SODIUM 30 MG: 30 INJECTION SUBCUTANEOUS at 09:54

## 2018-09-14 RX ADMIN — DORZOLAMIDE HYDROCHLORIDE AND TIMOLOL MALEATE 1 DROP: 20; 5 SOLUTION/ DROPS OPHTHALMIC at 09:57

## 2018-09-14 RX ADMIN — EZETIMIBE 10 MG: 10 TABLET ORAL at 09:54

## 2018-09-14 RX ADMIN — FUROSEMIDE 20 MG: 20 TABLET ORAL at 09:55

## 2018-09-14 RX ADMIN — LISINOPRIL 20 MG: 20 TABLET ORAL at 09:54

## 2018-09-14 RX ADMIN — METOPROLOL TARTRATE 50 MG: 50 TABLET ORAL at 09:55

## 2018-09-14 RX ADMIN — NYSTATIN: 100000 POWDER TOPICAL at 09:57

## 2018-09-14 NOTE — DISCHARGE SUMMARY
Discharge Summary - Audi Tomas 80 y o  female MRN: [id number]    Unit/Bed#: 2 [address] Encounter: [id number]    Admission Date: 9/11/2018     Admitting Diagnosis: Palpitations [R00 2]  Atrial fibrillation with RVR ([address] ) [I48 91]    HPI:  19-year-old femaleConsultsER with complaints of palpitations and found to be in atrial fibrillation with rapid ventricular response due to noncompliance with meds    Procedures Performed:   Orders Placed This Encounter   Procedures    ED ECG Documentation Only       Hospital Course:  Patient had not refilled her metoprolol medication in approximately a week prior to admission and then had an episode of rapid heart rates  She was noted to be in atrial fibrillation with rates in the 130s to 140s on presentation  She was given IV meds and restarted on her usual dose of metoprolol and converted back to sinus rhythm  Patient did have some leg edema was placed on low-dose Lasix with no signs of marked CHF  She did have an echocardiogram performed showed some mild valvular regurgitation but overall normal ejection fraction  Patient did have elevated troponin consistent with non STEMI type 2 due to her AFib with rapid ventricular response  Patient has been intolerant of statins in the past will be maintained on Zetia  She was not felt to be a candidate for full anticoagulation given risk of falls with her advanced age and given that episode of atrial fibrillation was less than 24 hours and will be maintained on aspirin daily  She had generalized weakness, therefore will was seen by PT and OT and a decision was made for short-term rehab for strengthening    Significant Findings, Care, Treatment and Services Provided:   [address] [place] [address]     Transthoracic Echocardiogram  2D, M-mode, Doppler, and Color Doppler     Study date:  11-Sep-2018     Patient: Sumeet Apodaca  MR number: [id number]  Account number: [id number]  : 1926  Age: 80 years  Gender: Female  Status: Inpatient  Location: Bedside  Height: 62 in  Weight: 121 7 lb  BP: 172/ 82 mmHg     Indications: Atrial fibrillation     Diagnoses: I48 0 - Atrial fibrillation     Sonographer:  Alex Dow RDCS  Primary Physician:  Pastor Sierra MD  Referring Physician:  Tammie Hooper DO  Group:  [id number]  Interpreting Physician:  Yumiko Amanda MD     SUMMARY     LEFT VENTRICLE:  Systolic function was normal  Ejection fraction was estimated to be 60 %  There were no regional wall motion abnormalities  There was mild concentric hypertrophy      LEFT ATRIUM:  The atrium was mildly dilated      MITRAL VALVE:  There was mild to moderate annular calcification  There was mild regurgitation      AORTIC VALVE:  There was trace regurgitation      TRICUSPID VALVE:  There was mild regurgitation  Pulmonary artery systolic pressure was mildly increased      HISTORY: PRIOR HISTORY: Hyperlipidemia, hypertension     PROCEDURE: The procedure was performed at the bedside  This was a routine study  The transthoracic approach was used  The study included complete 2D imaging, M-mode, complete spectral Doppler, and color Doppler  Images were obtained from  the parasternal, apical, subcostal, and suprasternal notch acoustic windows  Echocardiographic views were limited due to restricted patient mobility  This was a technically difficult study      LEFT VENTRICLE: Size was normal  Systolic function was normal  Ejection fraction was estimated to be 60 %  There were no regional wall motion abnormalities  Wall thickness was mildly to moderately increased  There was mild concentric  hypertrophy  DOPPLER: Left ventricular diastolic function parameters were normal for the patient's age   There was no evidence of elevated ventricular filling pressure by Doppler parameters      RIGHT VENTRICLE: The size was normal  Systolic function was normal  Wall thickness was normal      LEFT ATRIUM: The atrium was mildly dilated      RIGHT ATRIUM: Size was normal      MITRAL VALVE: There was mild to moderate annular calcification  There was mild diffuse thickening  There was normal leaflet separation  DOPPLER: The transmitral velocity was within the normal range  There was no evidence for stenosis  There was mild regurgitation      AORTIC VALVE: The valve was trileaflet  Leaflets exhibited normal thickness, normal cuspal separation, and sclerosis  DOPPLER: Transaortic velocity was within the normal range  There was no evidence for stenosis  There was trace  regurgitation      TRICUSPID VALVE: The valve structure was normal  There was normal leaflet separation  DOPPLER: The transtricuspid velocity was within the normal range  There was no evidence for stenosis  There was mild regurgitation  Pulmonary artery  systolic pressure was mildly increased      PULMONIC VALVE: Leaflets exhibited normal thickness, no calcification, and normal cuspal separation  DOPPLER: The transpulmonic velocity was within the normal range  There was no significant regurgitation      PERICARDIUM: There was no pericardial effusion   The pericardium was normal in appearance      AORTA: The root exhibited normal size      SYSTEMIC VEINS: IVC: The inferior vena cava was normal in size      PULMONARY VEINS: DOPPLER: Doppler signals were not recordable in the pulmonary vein(s)      SYSTEM MEASUREMENT TABLES     2D  %FS: 35 96 %  Ao Diam: 3 4 cm  EDV(Teich): 78 6 ml  EF Biplane: 59 85 %  EF(Teich): 65 95 %  ESV(Teich): 26 77 ml  IVSd: 1 08 cm  LA Area: 15 04 cm2  LA Diam: 4 cm  LAAs A2C: 16 19 cm2  LAAs A4C: 19 51 cm2  LAESV A-L A2C: 41 3 ml  LAESV A-L A4C: 59 98 ml  LAESV Index (A-L): 32 12 ml/m2  LAESV MOD A2C: 38 96 ml  LAESV MOD A4C: 50 03 ml  LAESV(A-L): 49 79 ml  LALs A2C: 5 39 cm  LALs A4C: 5 38 cm  LVEDV MOD A2C: 39 84 ml  LVEDV MOD A4C: 70 89 ml  LVEDV MOD BP: 52 88 ml  LVEF MOD A2C: 55 8 %  LVEF MOD A4C: 64 55 %  LVESV MOD A2C: 17 61 ml  LVESV MOD A4C: 25 13 ml  LVESV MOD BP: 21 23 ml  LVIDd: 4 2 cm  LVIDs: 2 69 cm  LVLd A2C: 6 79 cm  LVLd A4C: 6 9 cm  LVLs A2C: 5 53 cm  LVLs A4C: 5 81 cm  LVPWd: 0 99 cm  RA Area: 10 93 cm2  RVIDd: 2 3 cm  SV MOD A2C: 22 23 ml  SV MOD A4C: 45 76 ml  SV(Teich): 51 83 ml     CW  TR Vmax: 2 76 m/s  TR maxP 43 mmHg     MM  TAPSE: 1 94 cm     PW  AVC: 361 24 ms  E': 0 05 m/s  E/E': 13 82  MV A Scott: 0 86 m/s  MV Dec [place]: 3 59 m/s2  MV DecT: 205 94 ms  MV E Scott: 0 74 m/s  MV E/A Ratio: 0 86  MV PHT: 59 72 ms  MVA By PHT: 3 68 cm2     [facility] Accredited Echocardiography Laboratory     Prepared and electronically signed by  Ramon Mejia MD    General appearance: alert and cooperative  Neck: no adenopathy, no JVD and thyroid not enlarged, symmetric, no tenderness/mass/nodules  Lungs: clear to auscultation bilaterally no rales or rhonchi  Heart: regular rate and rhythm, no S3 or S4 and no rub  Abdomen: soft, non-tender; bowel sounds normal; no masses,  no organomegaly  Extremities: extremities normal, warm and well-perfused; no cyanosis, clubbing, or edema  Skin: Skin color, texture, turgor normal  No rashes or lesions  Neurologic:  No focal motor deficits  Generalized weakness       Complications: none    Discharge Diagnosis: Active Problems:    Noncompliance with medication regimen    Non-ST elevation myocardial infarction (NSTEMI) ([facility] )    Other hyperlipidemia    Essential hypertension    Chronic diastolic congestive heart failure ([facility] )    Advanced age    Fungal dermatitis    Severe protein-calorie malnutrition ([facility] )        Condition at Discharge: good     Discharge instructions/Information to patient and family:   See after visit summary for information provided to patient and family  Provisions for Follow-Up Care:  See after visit summary for information related to follow-up care and any pertinent home health orders        Disposition: Skilled nursing facility at [facility]    Planned Readmission: No    Discharge Statement   I spent 30 minutes discharging the patient  This time was spent on the day of discharge  I had direct contact with the patient on the day of discharge  Discharge Medications:  See after visit summary for reconciled discharge medications provided to patient and family      Left message on family member Nelson Tamayo phone number [phone] then [phone] [address] [place] [address],

## 2018-09-14 NOTE — PROGRESS NOTES
Pt observed to be sleeping during most hrly rounds overnight; denies discomfort  SR/SB on the monitor

## 2018-09-14 NOTE — PLAN OF CARE

## 2018-09-14 NOTE — SOCIAL WORK
Continuing to follow patient  As per Dr Eugenie Tamez patient is for discharge to [facility] SNF to continue her rehab  Met with patient, she is agreeable to rehab at [facility] as she was there in the past   She wants her family to transport  Spoke with her nephew, Araceli Dickey and he can transport her at 11:30  Notified  patient, Irina of admissions at [address] her nurse of discharge time

## 2018-10-05 ENCOUNTER — TELEPHONE (OUTPATIENT)
Dept: FAMILY MEDICINE CLINIC | Facility: HOSPITAL | Age: 83
End: 2018-10-05

## 2018-10-08 ENCOUNTER — TRANSITIONAL CARE MANAGEMENT (OUTPATIENT)
Dept: FAMILY MEDICINE CLINIC | Facility: HOSPITAL | Age: 83
End: 2018-10-08

## 2018-10-08 RX ORDER — ACETAMINOPHEN 325 MG/1
650 TABLET ORAL EVERY 6 HOURS PRN
COMMUNITY

## 2018-10-15 ENCOUNTER — OFFICE VISIT (OUTPATIENT)
Dept: FAMILY MEDICINE CLINIC | Facility: HOSPITAL | Age: 83
End: 2018-10-15
Payer: MEDICARE

## 2018-10-15 VITALS
DIASTOLIC BLOOD PRESSURE: 68 MMHG | BODY MASS INDEX: 21.9 KG/M2 | HEIGHT: 62 IN | HEART RATE: 67 BPM | WEIGHT: 119 LBS | SYSTOLIC BLOOD PRESSURE: 136 MMHG | OXYGEN SATURATION: 98 %

## 2018-10-15 DIAGNOSIS — I50.32 CHRONIC DIASTOLIC CONGESTIVE HEART FAILURE (HCC): ICD-10-CM

## 2018-10-15 DIAGNOSIS — R54 ADVANCED AGE: ICD-10-CM

## 2018-10-15 DIAGNOSIS — Z23 NEED FOR INFLUENZA VACCINATION: Primary | ICD-10-CM

## 2018-10-15 DIAGNOSIS — F01.51 VASCULAR DEMENTIA WITH BEHAVIOR DISTURBANCE (HCC): ICD-10-CM

## 2018-10-15 DIAGNOSIS — I10 ESSENTIAL HYPERTENSION: ICD-10-CM

## 2018-10-15 DIAGNOSIS — Z91.14 NONCOMPLIANCE WITH MEDICATION REGIMEN: ICD-10-CM

## 2018-10-15 PROCEDURE — G0008 ADMIN INFLUENZA VIRUS VAC: HCPCS

## 2018-10-15 PROCEDURE — 99495 TRANSJ CARE MGMT MOD F2F 14D: CPT | Performed by: INTERNAL MEDICINE

## 2018-10-15 PROCEDURE — 90662 IIV NO PRSV INCREASED AG IM: CPT

## 2018-10-15 NOTE — PROGRESS NOTES
Subjective:   Chief Complaint   Patient presents with    Transition of Care Management     NH         Patient ID: Darien Cochran is a 80 y o  female  Here for leonela after SNF discharge  She was hospitalized for a fib, diastolic HF and this was felt to be due to noncompliance with meds  In office today she s anxious and shaky  She is confused  Wants to stop lasix due to inc  Urination and is adamant about this  Discussed driving issues and I have strongly advised against it  At which point, she began to cry and stated that she has no one to help her and no one to drive her   2 nephews live with her but she does own the home  Apparently, they work sporadically  She also described some vivid hallucinations to me which she strongly felt were real, about a lady in her hamper and dollar bills in hamper as well and that she took the money and put it in her bank acct but then someone stole the statements  All of this was confused and she was quite agitated  The following portions of the patient's history were reviewed and updated as appropriate: allergies, current medications, past family history, past medical history, past social history, past surgical history and problem list     Review of Systems   Constitutional: Negative for fatigue and fever  HENT: Negative for hearing loss  Eyes: Negative for visual disturbance  Respiratory: Negative for cough, chest tightness, shortness of breath and wheezing  Cardiovascular: Negative for chest pain, palpitations and leg swelling  Gastrointestinal: Negative for abdominal pain, diarrhea and nausea  Genitourinary: Negative for dysuria and hematuria  Musculoskeletal: Positive for gait problem  Negative for arthralgias  Skin: Positive for pallor  Neurological: Positive for dizziness and weakness  Negative for numbness and headaches  Psychiatric/Behavioral: Positive for confusion and hallucinations  Negative for dysphoric mood   The patient is nervous/anxious  All other systems reviewed and are negative  Current Outpatient Prescriptions on File Prior to Visit   Medication Sig Dispense Refill    acetaminophen (TYLENOL) 325 mg tablet Take 650 mg by mouth every 6 (six) hours as needed for mild pain      aspirin 81 MG tablet Take 1 tablet by mouth daily      bimatoprost (LUMIGAN) 0 01 % ophthalmic drops Administer 1 drop to both eyes daily at bedtime      docusate sodium (COLACE) 100 mg capsule Take 1 capsule (100 mg total) by mouth 2 (two) times a day 10 capsule 0    dorzolamide-timolol (COSOPT) 22 3-6 8 MG/ML ophthalmic solution Apply to eye Twice daily      ezetimibe (ZETIA) 10 mg tablet Take 1 tablet (10 mg total) by mouth daily 30 tablet 5    furosemide (LASIX) 20 mg tablet Take 1 tablet (20 mg total) by mouth daily  0    lisinopril (ZESTRIL) 20 mg tablet Take 1 tablet (20 mg total) by mouth daily 30 tablet 5    metoprolol tartrate (LOPRESSOR) 50 mg tablet Take 1 tablet (50 mg total) by mouth 2 (two) times a day 60 tablet 5    nystatin (MYCOSTATIN) powder Apply topically 2 (two) times a day 15 g 0     No current facility-administered medications on file prior to visit  Objective:  Vitals:    10/15/18 0937   BP: 136/68   Pulse: 67   SpO2: 98%   Weight: 54 kg (119 lb)   Height: 5' 2" (1 575 m)      Physical Exam   Constitutional: She is oriented to person, place, and time  She appears distressed  Eyes: Pupils are equal, round, and reactive to light  Neck: Normal range of motion  Neck supple  No thyromegaly present  Cardiovascular: Normal rate, regular rhythm, normal heart sounds and intact distal pulses  No murmur heard  Pulmonary/Chest: Effort normal and breath sounds normal  She has no wheezes  She has no rales  Abdominal: Soft  Bowel sounds are normal  There is no tenderness  Musculoskeletal: Normal range of motion  She exhibits edema (trace at ankles)  She exhibits no tenderness or deformity  Lymphadenopathy:     She has no cervical adenopathy  Neurological: She is alert and oriented to person, place, and time  She has normal reflexes  Skin: Skin is warm and dry  Psychiatric: She has a normal mood and affect  Nursing note and vitals reviewed  Assessment/Plan:    Chronic diastolic congestive heart failure (HCC)  Hold lasix and follow  Essential hypertension  stable    Vascular dementia with behavior disturbance  Needs better care,  Have discussed with VN and social service    Noncompliance with medication regimen  She has confusion and anxiety about her meds  Advanced age  Increasing evidence of frailty          Diagnoses and all orders for this visit:    Need for influenza vaccination  -     influenza vaccine, [id number], high-dose, PF 0 5 mL, for patients 65 yr+ (FLUZONE HIGH-DOSE)    Chronic diastolic congestive heart failure ([address] )    Noncompliance with medication regimen    Advanced age    Vascular dementia with behavior disturbance    Essential hypertension

## 2018-10-15 NOTE — PATIENT INSTRUCTIONS
You have been seen today for a hospital discharge follow up visit  The purpose of the visit is to be sure that you are feeling well and taking all medications as ordered  This visit is scheduled so that any post hospital questions you have can be addressed  The doctor or nurse will review all medications and will provide refills of medications  You may be asked to get some follow up labs or other testing done, please do this as soon as able  If  You are seeing a specialist for follow up, let us know so we can do appropriate referrals  Schedule your next routine visit today so that we can keep you on track and healthy  OK to stop furosemide for now        VN and social service consult

## 2018-10-19 ENCOUNTER — PATIENT OUTREACH (OUTPATIENT)
Dept: FAMILY MEDICINE CLINIC | Facility: HOSPITAL | Age: 83
End: 2018-10-19

## 2018-10-19 NOTE — PROGRESS NOTES
Reached out to patient to introduce myself and the outpatient care management program  Initially Pedro Bell answered the phone, but she quickly handed the phone to her nephew Michelle Hernandez seemed very defensive at the start of our conversation  I told him the reason for my call, and the services I could provide  He stated that "she's fine" and that "we don't need your help"  He also mentioned that they have a visiting nurse,  and PT coming out to the home  When I mentioned that I could facilitate potentially moving Pedro Bell to an assisted living facility where all her needs would be met, Michelle Hernandez again said that, they are fine and they don't want my help with anything  I ended the phone call by stating that if he changed his mind, or felt that I could be of assistance with anything, to not hesitate to reach out  He agreed he would  Will close for now

## 2018-10-22 ENCOUNTER — APPOINTMENT (EMERGENCY)
Dept: CT IMAGING | Facility: HOSPITAL | Age: 83
End: 2018-10-22
Payer: MEDICARE

## 2018-10-22 ENCOUNTER — APPOINTMENT (EMERGENCY)
Dept: RADIOLOGY | Facility: HOSPITAL | Age: 83
End: 2018-10-22
Payer: MEDICARE

## 2018-10-22 ENCOUNTER — HOSPITAL ENCOUNTER (OUTPATIENT)
Facility: HOSPITAL | Age: 83
Setting detail: OBSERVATION
Discharge: HOME HEALTH | End: 2018-10-24
Attending: EMERGENCY MEDICINE | Admitting: INTERNAL MEDICINE
Payer: MEDICARE

## 2018-10-22 DIAGNOSIS — I10 HYPERTENSION: ICD-10-CM

## 2018-10-22 DIAGNOSIS — R55 PRE-SYNCOPE: ICD-10-CM

## 2018-10-22 DIAGNOSIS — R42 DIZZINESS: Primary | ICD-10-CM

## 2018-10-22 DIAGNOSIS — R00.1 BRADYCARDIA: ICD-10-CM

## 2018-10-22 DIAGNOSIS — I10 ESSENTIAL HYPERTENSION: Chronic | ICD-10-CM

## 2018-10-22 DIAGNOSIS — N39.0 UTI (URINARY TRACT INFECTION): ICD-10-CM

## 2018-10-22 DIAGNOSIS — R53.1 WEAKNESS: ICD-10-CM

## 2018-10-22 LAB
ALBUMIN SERPL BCP-MCNC: 3.9 G/DL (ref 3.5–5)
ALP SERPL-CCNC: 110 U/L (ref 46–116)
ALT SERPL W P-5'-P-CCNC: 20 U/L (ref 12–78)
ANION GAP SERPL CALCULATED.3IONS-SCNC: 10 MMOL/L (ref 4–13)
AST SERPL W P-5'-P-CCNC: 19 U/L (ref 5–45)
BASOPHILS # BLD AUTO: 0.08 THOUSANDS/ΜL (ref 0–0.1)
BASOPHILS NFR BLD AUTO: 1 % (ref 0–1)
BILIRUB SERPL-MCNC: 0.5 MG/DL (ref 0.2–1)
BILIRUB UR QL STRIP: NEGATIVE
BUN SERPL-MCNC: 24 MG/DL (ref 5–25)
CALCIUM SERPL-MCNC: 9.9 MG/DL (ref 8.3–10.1)
CHLORIDE SERPL-SCNC: 104 MMOL/L (ref 100–108)
CLARITY UR: ABNORMAL
CO2 SERPL-SCNC: 27 MMOL/L (ref 21–32)
COLOR UR: YELLOW
CREAT SERPL-MCNC: 0.74 MG/DL (ref 0.6–1.3)
EOSINOPHIL # BLD AUTO: 0.47 THOUSAND/ΜL (ref 0–0.61)
EOSINOPHIL NFR BLD AUTO: 5 % (ref 0–6)
ERYTHROCYTE [DISTWIDTH] IN BLOOD BY AUTOMATED COUNT: 14.5 % (ref 11.6–15.1)
GFR SERPL CREATININE-BSD FRML MDRD: 71 ML/MIN/1.73SQ M
GLUCOSE SERPL-MCNC: 104 MG/DL (ref 65–140)
GLUCOSE UR STRIP-MCNC: NEGATIVE MG/DL
HCT VFR BLD AUTO: 44.6 % (ref 34.8–46.1)
HGB BLD-MCNC: 14.3 G/DL (ref 11.5–15.4)
HGB UR QL STRIP.AUTO: ABNORMAL
IMM GRANULOCYTES # BLD AUTO: 0.04 THOUSAND/UL (ref 0–0.2)
IMM GRANULOCYTES NFR BLD AUTO: 0 % (ref 0–2)
KETONES UR STRIP-MCNC: ABNORMAL MG/DL
LEUKOCYTE ESTERASE UR QL STRIP: ABNORMAL
LYMPHOCYTES # BLD AUTO: 2.86 THOUSANDS/ΜL (ref 0.6–4.47)
LYMPHOCYTES NFR BLD AUTO: 28 % (ref 14–44)
MAGNESIUM SERPL-MCNC: 2.3 MG/DL (ref 1.6–2.6)
MCH RBC QN AUTO: 29.7 PG (ref 26.8–34.3)
MCHC RBC AUTO-ENTMCNC: 32.1 G/DL (ref 31.4–37.4)
MCV RBC AUTO: 93 FL (ref 82–98)
MONOCYTES # BLD AUTO: 1.02 THOUSAND/ΜL (ref 0.17–1.22)
MONOCYTES NFR BLD AUTO: 10 % (ref 4–12)
NEUTROPHILS # BLD AUTO: 5.79 THOUSANDS/ΜL (ref 1.85–7.62)
NEUTS SEG NFR BLD AUTO: 56 % (ref 43–75)
NITRITE UR QL STRIP: NEGATIVE
NRBC BLD AUTO-RTO: 0 /100 WBCS
PH UR STRIP.AUTO: 6.5 [PH] (ref 4.5–8)
PLATELET # BLD AUTO: 192 THOUSANDS/UL (ref 149–390)
PMV BLD AUTO: 12.1 FL (ref 8.9–12.7)
POTASSIUM SERPL-SCNC: 4.4 MMOL/L (ref 3.5–5.3)
PROT SERPL-MCNC: 7.6 G/DL (ref 6.4–8.2)
PROT UR STRIP-MCNC: NEGATIVE MG/DL
RBC # BLD AUTO: 4.81 MILLION/UL (ref 3.81–5.12)
SODIUM SERPL-SCNC: 141 MMOL/L (ref 136–145)
SP GR UR STRIP.AUTO: 1.01 (ref 1–1.03)
UROBILINOGEN UR QL STRIP.AUTO: 1 E.U./DL
WBC # BLD AUTO: 10.26 THOUSAND/UL (ref 4.31–10.16)

## 2018-10-22 PROCEDURE — 1124F ACP DISCUSS-NO DSCNMKR DOCD: CPT | Performed by: INTERNAL MEDICINE

## 2018-10-22 PROCEDURE — 71045 X-RAY EXAM CHEST 1 VIEW: CPT

## 2018-10-22 PROCEDURE — 81001 URINALYSIS AUTO W/SCOPE: CPT | Performed by: EMERGENCY MEDICINE

## 2018-10-22 PROCEDURE — 36415 COLL VENOUS BLD VENIPUNCTURE: CPT | Performed by: EMERGENCY MEDICINE

## 2018-10-22 PROCEDURE — 99285 EMERGENCY DEPT VISIT HI MDM: CPT

## 2018-10-22 PROCEDURE — 80053 COMPREHEN METABOLIC PANEL: CPT | Performed by: EMERGENCY MEDICINE

## 2018-10-22 PROCEDURE — 85025 COMPLETE CBC W/AUTO DIFF WBC: CPT | Performed by: EMERGENCY MEDICINE

## 2018-10-22 PROCEDURE — 83735 ASSAY OF MAGNESIUM: CPT | Performed by: EMERGENCY MEDICINE

## 2018-10-22 PROCEDURE — 70450 CT HEAD/BRAIN W/O DYE: CPT

## 2018-10-22 PROCEDURE — 93005 ELECTROCARDIOGRAM TRACING: CPT

## 2018-10-22 PROCEDURE — 84484 ASSAY OF TROPONIN QUANT: CPT | Performed by: EMERGENCY MEDICINE

## 2018-10-22 RX ORDER — LISINOPRIL 5 MG/1
10 TABLET ORAL ONCE
Status: COMPLETED | OUTPATIENT
Start: 2018-10-23 | End: 2018-10-23

## 2018-10-23 PROBLEM — R53.1 WEAKNESS: Status: ACTIVE | Noted: 2018-10-23

## 2018-10-23 PROBLEM — E43 SEVERE PROTEIN-CALORIE MALNUTRITION (HCC): Status: RESOLVED | Noted: 2018-09-12 | Resolved: 2018-10-23

## 2018-10-23 PROBLEM — R42 DIZZINESS: Status: ACTIVE | Noted: 2018-10-23

## 2018-10-23 PROBLEM — I48.91 ATRIAL FIBRILLATION (HCC): Chronic | Status: ACTIVE | Noted: 2018-09-11

## 2018-10-23 PROBLEM — R00.1 BRADYCARDIA: Status: ACTIVE | Noted: 2018-10-23

## 2018-10-23 PROBLEM — R55 PRE-SYNCOPE: Status: ACTIVE | Noted: 2018-10-23

## 2018-10-23 PROBLEM — N39.0 UTI (URINARY TRACT INFECTION): Status: ACTIVE | Noted: 2018-10-23

## 2018-10-23 PROBLEM — I50.32 CHRONIC DIASTOLIC CONGESTIVE HEART FAILURE (HCC): Chronic | Status: ACTIVE | Noted: 2018-09-12

## 2018-10-23 LAB
ANION GAP SERPL CALCULATED.3IONS-SCNC: 6 MMOL/L (ref 4–13)
ATRIAL RATE: 50 BPM
BACTERIA UR QL AUTO: ABNORMAL /HPF
BUN SERPL-MCNC: 19 MG/DL (ref 5–25)
CALCIUM SERPL-MCNC: 8.8 MG/DL (ref 8.3–10.1)
CHLORIDE SERPL-SCNC: 109 MMOL/L (ref 100–108)
CO2 SERPL-SCNC: 28 MMOL/L (ref 21–32)
CREAT SERPL-MCNC: 0.56 MG/DL (ref 0.6–1.3)
ERYTHROCYTE [DISTWIDTH] IN BLOOD BY AUTOMATED COUNT: 14.3 % (ref 11.6–15.1)
GFR SERPL CREATININE-BSD FRML MDRD: 81 ML/MIN/1.73SQ M
GLUCOSE P FAST SERPL-MCNC: 95 MG/DL (ref 65–99)
GLUCOSE SERPL-MCNC: 95 MG/DL (ref 65–140)
HCT VFR BLD AUTO: 40.3 % (ref 34.8–46.1)
HGB BLD-MCNC: 12.9 G/DL (ref 11.5–15.4)
MAGNESIUM SERPL-MCNC: 2.1 MG/DL (ref 1.6–2.6)
MCH RBC QN AUTO: 29.8 PG (ref 26.8–34.3)
MCHC RBC AUTO-ENTMCNC: 32 G/DL (ref 31.4–37.4)
MCV RBC AUTO: 93 FL (ref 82–98)
NON-SQ EPI CELLS URNS QL MICRO: ABNORMAL /HPF
PLATELET # BLD AUTO: 167 THOUSANDS/UL (ref 149–390)
PMV BLD AUTO: 11.7 FL (ref 8.9–12.7)
POTASSIUM SERPL-SCNC: 3.7 MMOL/L (ref 3.5–5.3)
PR INTERVAL: 160 MS
QRS AXIS: -23 DEGREES
QRSD INTERVAL: 76 MS
QT INTERVAL: 432 MS
QTC INTERVAL: 393 MS
RBC # BLD AUTO: 4.33 MILLION/UL (ref 3.81–5.12)
RBC #/AREA URNS AUTO: ABNORMAL /HPF
SODIUM SERPL-SCNC: 143 MMOL/L (ref 136–145)
T WAVE AXIS: 1 DEGREES
TROPONIN I SERPL-MCNC: <0.02 NG/ML
TSH SERPL DL<=0.05 MIU/L-ACNC: 2.13 UIU/ML (ref 0.36–3.74)
VENTRICULAR RATE: 50 BPM
WBC # BLD AUTO: 8.44 THOUSAND/UL (ref 4.31–10.16)
WBC #/AREA URNS AUTO: ABNORMAL /HPF

## 2018-10-23 PROCEDURE — 83735 ASSAY OF MAGNESIUM: CPT | Performed by: NURSE PRACTITIONER

## 2018-10-23 PROCEDURE — 80048 BASIC METABOLIC PNL TOTAL CA: CPT | Performed by: NURSE PRACTITIONER

## 2018-10-23 PROCEDURE — 84443 ASSAY THYROID STIM HORMONE: CPT | Performed by: INTERNAL MEDICINE

## 2018-10-23 PROCEDURE — 93010 ELECTROCARDIOGRAM REPORT: CPT | Performed by: INTERNAL MEDICINE

## 2018-10-23 PROCEDURE — 85027 COMPLETE CBC AUTOMATED: CPT | Performed by: NURSE PRACTITIONER

## 2018-10-23 PROCEDURE — 99220 PR INITIAL OBSERVATION CARE/DAY 70 MINUTES: CPT | Performed by: INTERNAL MEDICINE

## 2018-10-23 RX ORDER — DORZOLAMIDE HYDROCHLORIDE AND TIMOLOL MALEATE 20; 5 MG/ML; MG/ML
1 SOLUTION/ DROPS OPHTHALMIC 2 TIMES DAILY
Status: DISCONTINUED | OUTPATIENT
Start: 2018-10-23 | End: 2018-10-24 | Stop reason: HOSPADM

## 2018-10-23 RX ORDER — DOCUSATE SODIUM 100 MG/1
100 CAPSULE, LIQUID FILLED ORAL 2 TIMES DAILY
Status: DISCONTINUED | OUTPATIENT
Start: 2018-10-23 | End: 2018-10-24 | Stop reason: HOSPADM

## 2018-10-23 RX ORDER — ACETAMINOPHEN 325 MG/1
650 TABLET ORAL EVERY 6 HOURS PRN
Status: DISCONTINUED | OUTPATIENT
Start: 2018-10-23 | End: 2018-10-24 | Stop reason: HOSPADM

## 2018-10-23 RX ORDER — AMLODIPINE BESYLATE 5 MG/1
5 TABLET ORAL DAILY
Status: DISCONTINUED | OUTPATIENT
Start: 2018-10-23 | End: 2018-10-24 | Stop reason: HOSPADM

## 2018-10-23 RX ORDER — HEPARIN SODIUM 5000 [USP'U]/ML
5000 INJECTION, SOLUTION INTRAVENOUS; SUBCUTANEOUS EVERY 8 HOURS SCHEDULED
Status: DISCONTINUED | OUTPATIENT
Start: 2018-10-23 | End: 2018-10-24 | Stop reason: HOSPADM

## 2018-10-23 RX ORDER — EZETIMIBE 10 MG/1
10 TABLET ORAL DAILY
Status: DISCONTINUED | OUTPATIENT
Start: 2018-10-23 | End: 2018-10-24 | Stop reason: HOSPADM

## 2018-10-23 RX ORDER — ASPIRIN 81 MG/1
81 TABLET, CHEWABLE ORAL DAILY
Status: DISCONTINUED | OUTPATIENT
Start: 2018-10-23 | End: 2018-10-24 | Stop reason: HOSPADM

## 2018-10-23 RX ORDER — CEPHALEXIN 250 MG/1
500 CAPSULE ORAL ONCE
Status: COMPLETED | OUTPATIENT
Start: 2018-10-23 | End: 2018-10-23

## 2018-10-23 RX ORDER — LISINOPRIL 20 MG/1
20 TABLET ORAL DAILY
Status: DISCONTINUED | OUTPATIENT
Start: 2018-10-23 | End: 2018-10-24 | Stop reason: HOSPADM

## 2018-10-23 RX ORDER — HYDRALAZINE HYDROCHLORIDE 20 MG/ML
5 INJECTION INTRAMUSCULAR; INTRAVENOUS EVERY 6 HOURS PRN
Status: DISCONTINUED | OUTPATIENT
Start: 2018-10-23 | End: 2018-10-24 | Stop reason: HOSPADM

## 2018-10-23 RX ADMIN — SODIUM CHLORIDE 250 ML: 0.9 INJECTION, SOLUTION INTRAVENOUS at 02:50

## 2018-10-23 RX ADMIN — DORZOLAMIDE HYDROCHLORIDE AND TIMOLOL MALEATE 1 DROP: 20; 5 SOLUTION/ DROPS OPHTHALMIC at 10:15

## 2018-10-23 RX ADMIN — HEPARIN SODIUM 5000 UNITS: 5000 INJECTION INTRAVENOUS; SUBCUTANEOUS at 14:18

## 2018-10-23 RX ADMIN — BIMATOPROST 1 DROP: 0.1 SOLUTION/ DROPS OPHTHALMIC at 22:04

## 2018-10-23 RX ADMIN — LISINOPRIL 10 MG: 5 TABLET ORAL at 00:01

## 2018-10-23 RX ADMIN — EZETIMIBE 10 MG: 10 TABLET ORAL at 10:15

## 2018-10-23 RX ADMIN — AMLODIPINE BESYLATE 5 MG: 5 TABLET ORAL at 10:14

## 2018-10-23 RX ADMIN — HEPARIN SODIUM 5000 UNITS: 5000 INJECTION INTRAVENOUS; SUBCUTANEOUS at 22:05

## 2018-10-23 RX ADMIN — CEPHALEXIN 500 MG: 250 CAPSULE ORAL at 00:54

## 2018-10-23 RX ADMIN — DOCUSATE SODIUM 100 MG: 100 CAPSULE, LIQUID FILLED ORAL at 10:14

## 2018-10-23 RX ADMIN — DORZOLAMIDE HYDROCHLORIDE AND TIMOLOL MALEATE 1 DROP: 20; 5 SOLUTION/ DROPS OPHTHALMIC at 17:57

## 2018-10-23 RX ADMIN — DOCUSATE SODIUM 100 MG: 100 CAPSULE, LIQUID FILLED ORAL at 17:57

## 2018-10-23 RX ADMIN — ASPIRIN 81 MG 81 MG: 81 TABLET ORAL at 10:15

## 2018-10-23 RX ADMIN — LISINOPRIL 20 MG: 20 TABLET ORAL at 10:14

## 2018-10-23 NOTE — UTILIZATION REVIEW
Initial Clinical Review  Admission: Date/Time/Statement:   Admission Orders     Ordered        10/23/18 0056  Place in Observation (expected length of stay for this patient is less than two midnights)  Once             Orders Placed This Encounter   Procedures    Place in Observation (expected length of stay for this patient is less than two midnights)     Standing Status:   Standing     Number of Occurrences:   1     Order Specific Question:   Admitting Physician     Answer:   Ashwin oLpez [[id number]]     Order Specific Question:   Level of Care     Answer:   Med Surg [16]   ED: Date/Time/Mode of Arrival:   ED Arrival Information     Expected Arrival Acuity Means of Arrival Escorted By Service Admission Type    - 10/22/2018 23:02 Urgent Ambulance [facility] Urgent    Arrival Complaint    DIZZINESS      Chief Complaint:   Chief Complaint   Patient presents with    Dizziness     pt presents to ER stating she has been feeling dizzy when she stands and weaker than normal    History of Illness:   79 yo F with PMH of afib, HTN presents to ED via EMS from home for lightheadedness, which has mostly resolved  Pt was recently admitted to this hospital for rapid afib, was sent to rehab, and is now back at home  Has been in normal state of health, ate dinner, cleaned the dishes tonight, and sat down for awhile  When she stood up to go to the restroom, she felt very lightheaded and like she was going to fall  She steadied herself and called EMS  Denies any prodrome, or CP/SOB/HA/N/V since the incident  Feels better now, but not quite back to normal  Denies pain  Denies similar sx in past  Only recent med change was her lasix was stopped about a week ago due to the fact that she didn't like that it made her urinate so often (PCP d/c)    ED Vital Signs:   ED Triage Vitals   Temperature Pulse Respirations Blood Pressure SpO2   10/22/18 2304 10/22/18 2304 10/22/18 2304 10/22/18 2306 10/22/18 2304   (!) 96 8 °F (36 °C) 56 18 (!) 196/90 97 %      Temp Source Heart Rate Source Patient Position - Orthostatic VS BP Location FiO2 (%)   10/22/18 2304 10/22/18 2304 10/22/18 2318 10/22/18 2318 --   Temporal Monitor Lying - Orthostatic VS Left arm       Pain Score       10/22/18 2304       No Pain        Wt Readings from Last 1 Encounters:   10/23/18 55 1 kg (121 lb 7 6 oz)   Vital Signs (abnormal):   /81, 174/70, 181/79  Abnormal Labs/Diagnostic Test Results:   TROP NEG   CR 0 56   EKG=  Ventricular Rate BPM 50    Atrial Rate BPM 50    MA Interval ms 160    QRSD Interval ms 76    QT Interval ms 432    QTC Interval ms 393    P Axis degrees    QRS Axis degrees -23    T Wave Axis degrees 1      Sinus bradycardia  Nonspecific ST abnormality  Abnormal ECG  When compared with ECG of 11-SEP-2018 06:23,  Sinus rhythm has replaced Atrial fibrillation  Vent  rate has decreased BY  81 BPM  ST no longer depressed in Anterior leads  T wave inversion now evident in Inferior leads  T wave inversion no longer evident in Lateral leads      ED Treatment:   Medication Administration from 10/22/2018 2302 to 10/23/2018 0132       Date/Time Order Dose Route Action Action by Comments     10/23/2018 0001 lisinopril (ZESTRIL) tablet 10 mg 10 mg Oral Given Jacquelyn Houston RN      10/23/2018 0054 cephalexin (KEFLEX) capsule 500 mg 500 mg Oral Given Jacquelyn Houston RN       Past Medical/Surgical History:    Active Ambulatory Problems     Diagnosis Date Noted    Atrial fibrillation ([facility] ) 09/11/2018    Other hyperlipidemia 12/29/2014    Essential hypertension 02/11/2014    Advanced age 10/16/2017    Noncompliance with medication regimen 09/11/2018    Fungal dermatitis 09/11/2018    [facility] ) 09/12/2018    Non-ST elevation myocardial infarction (NSTEMI) ([facility] ) 09/12/2018    Vascular dementia with behavior disturbance 10/15/2018     Resolved Ambulatory Problems     Diagnosis Date Noted    Severe protein-calorie malnutrition ([facility] ) 09/12/2018     Past Medical History:   Diagnosis Date    A-fib ([facility] )     CHF (congestive heart failure) (HCC)     History of varicose veins     Hypertension    Admitting Diagnosis: Dizziness [R42]  Bradycardia [R00 1]  UTI (urinary tract infection) [N39 0]  Weakness [R53 1]  Hypertension [I10]  Age/Sex: 80 y o  female  Assessment/Plan:   Pre-syncope   Assessment & Plan     Presented to emergency department for evaluation of dizziness after standing  Denies similar symptoms and reports that she is weaker when ambulating  There are no metabolic abnormalities or orthostatic tilt, possibly due to dehydration or vasovagal   - vital signs per unit  - 250 cc bolus now   - EKG shows sinus bradycardia, heart rate 50, , T-wave inversion in inferior leads  - CT of head no acute intracranial abnormalities    Patient presents with no focal neuro deficits  - continue to trend Neuro exam  - OOB with assistance  - BMP and CBC in am    Bradycardia   Assessment & Plan     - heart rate in 50s, previously admission heart rate 60-65  - continue telemetry monitoring and trend vital signs  - hold metoprolol   Admission Orders:  TELEMETRY  ORTHOSTATIC VS  PT/OT SHANAAL & Fabien Dietrich  Scheduled Meds:   Current Facility-Administered Medications:  acetaminophen 650 mg Oral Q6H PRN [facility], CRNP   amLODIPine 5 mg Oral Daily Chandler Melton MD   aspirin 81 mg Oral Daily Debbie Houston, CRNP   bimatoprost 1 drop Both Eyes HS Debbie Houston, CRNP   docusate sodium 100 mg Oral BID [facility], CRNP   dorzolamide-timolol 1 drop Both Eyes BID [facility], CRNP   ezetimibe 10 mg Oral Daily Debbie Houston, CRNP   heparin (porcine) 5,000 Units Subcutaneous Q8H [place], CRNP   hydrALAZINE 5 mg Intravenous Q6H PRN Chandler Melton MD   lisinopril 20 mg Oral Daily [facility], CRNP     Continuous Infusions:    PRN Meds:   acetaminophen    hydrALAZINE

## 2018-10-23 NOTE — H&P
[address] [facility]    H&P- Aubrey Gonzalez 1/9/1926, 80 y o  female MRN: [id number]    Unit/Bed#: [address] [place] [address] Encounter: [id number]    Primary Care Provider: David Day MD   Date and time admitted to hospital: 10/22/2018 11:02 PM        * Pre-syncope   Assessment & Plan    Presented to emergency department for evaluation of dizziness after standing  Denies similar symptoms and reports that she is weaker when ambulating  There are no metabolic abnormalities or orthostatic tilt, possibly due to dehydration or vasovagal     - vital signs per unit  - 250 cc bolus now   - EKG shows sinus bradycardia, heart rate 50, , T-wave inversion in inferior leads  - CT of head no acute intracranial abnormalities  Patient presents with no focal neuro deficits  - continue to trend Neuro exam  - OOB with assistance  - BMP and CBC in am      Bradycardia   Assessment & Plan    - heart rate in 50s, previously admission heart rate 60-65  - continue telemetry monitoring and trend vital signs  - hold metoprolol       Weakness   Assessment & Plan    -consult PT/OT  -OOB with assist     Chronic diastolic congestive heart failure (HCC)   Assessment & Plan    -patient reports that she is no longer taking Lasix  -no signs of fluid overload or increased oxygen requirement  -echocardiogram on 09/11/18:  EF 60%, no regional wall abnormalities       Severe protein-calorie malnutrition (HCC)   Assessment & Plan    Malnutrition Findings:       - cachectic advanced age female  - Consult nutrition       BMI Findings: Body mass index is 18 65 kg/m²  UTI (urinary tract infection)   Assessment & Plan    - urinalysis complete, negative for nitrates epithelia cells likely contamination  Given 1 time dose of Keflex in the ED  Patient currently denies dysuria or urinary frequency, will not continue antibiotics at this time       Essential hypertension   Assessment & Plan    -continue home BP meds with exception of the metoprolol in the setting of bradycardia     Atrial fibrillation (HCC)   Assessment & Plan    History of atrial fibrillation, No anticoagulation  -continue telemetry monitoring and treating vital signs         VTE Prophylaxis: Heparin  / sequential compression device   Code Status:  Level 3 DNR DNI  POLST: There is no POLST form on file for this patient (pre-hospital)  Discussion with family:  None    Anticipated Length of Stay:  Patient will be admitted on an Observation basis with an anticipated length of stay of  < 2 midnights  Justification for Hospital Stay:  Presyncope    Total Time for Visit, including Counseling / Coordination of Care: 30 minutes  Greater than 50% of this total time spent on direct patient counseling and coordination of care  Chief Complaint:   "I felt dizzy, like I was going to pass out    History of Present Illness:    Aubrey Gonzalez is a 80 y o  female with past medical history of hypertension, AFib without anticoagulation, severe protein calorie malnutrition and chronic diastolic CHF  who presents after a presyncopal episode  Reports that she stood from seated position and became dizzy while walking to the restroom this evening  Prior to this event patient reports that she was ambulating without difficulty with her assistive devices  Currently reports associated symptom of generalized weakness and she is fearful that she will fall during ambulation  Upon arrival to emergency department patient was also found to be bradycardic with heart rate 50s and hypertensive, administer 1 time dose of lisinopril 10 mg  CT head revealed no acute intracranial abnormalities  During my evaluation patient reports that dizziness has resolved, is in no acute distress, hemodynamically stable, alert and oriented  Will admit patient as observation to [facility] telemetry unit for close monitoring  The patient (and any family present) verbalized understanding of the plan of care  Specifically highlighted areas of special concern regarding plan of care and highlighted treatment plan  All questions were answered prior to leaving room  Review of Systems:    Review of Systems   Constitutional: Negative for chills, fatigue and fever  Eyes: Negative for visual disturbance  Respiratory: Negative for chest tightness, shortness of breath and wheezing  Cardiovascular: Negative for chest pain and palpitations  Gastrointestinal: Negative for diarrhea, nausea and vomiting  Genitourinary: Negative for dysuria, frequency and urgency  Skin: Negative for rash and wound  Neurological: Positive for dizziness  Negative for tremors, seizures, facial asymmetry, speech difficulty, weakness, numbness and headaches  Psychiatric/Behavioral: Negative for agitation and confusion  All other systems reviewed and are negative  Past Medical and Surgical History:     Past Medical History:   Diagnosis Date    A-fib [facility])     last assessed 10/16/17    CHF (congestive heart failure) (HCC)     History of varicose veins     Hypertension        History reviewed  No pertinent surgical history  Meds/Allergies:    Prior to Admission medications    Medication Sig Start Date End Date Taking?  Authorizing Provider   acetaminophen (TYLENOL) 325 mg tablet Take 650 mg by mouth every 6 (six) hours as needed for mild pain    Historical Provider, MD   aspirin 81 MG tablet Take 1 tablet by mouth daily    Historical Provider, MD   bimatoprost (LUMIGAN) 0 01 % ophthalmic drops Administer 1 drop to both eyes daily at bedtime    Historical Provider, MD   docusate sodium (COLACE) 100 mg capsule Take 1 capsule (100 mg total) by mouth 2 (two) times a day 9/14/18   Souleymane Gayle DO   dorzolamide-timolol (COSOPT) 22 3-6 8 MG/ML ophthalmic solution Apply to eye Twice daily 10/8/14   Historical Provider, MD   ezetimibe (ZETIA) 10 mg tablet Take 1 tablet (10 mg total) by mouth daily 5/17/18   Hanh Pope MD furosemide (LASIX) 20 mg tablet Take 1 tablet (20 mg total) by mouth daily 9/14/18   Ivette Harding DO   lisinopril (ZESTRIL) 20 mg tablet Take 1 tablet (20 mg total) by mouth daily 5/17/18   Valery Price MD   metoprolol tartrate (LOPRESSOR) 50 mg tablet Take 1 tablet (50 mg total) by mouth 2 (two) times a day 9/10/18   Valery Priec MD   nystatin (MYCOSTATIN) powder Apply topically 2 (two) times a day 9/14/18   Ivette NapolessDO     I have reviewed home medications using allscripts  Allergies: Allergies   Allergen Reactions    Penicillins     Pollen Extract     Sulfa Antibiotics        Social History:     Marital Status:    Occupation:  Retired  Patient Pre-hospital Living Situation:  Home with to Adult nephews  Patient Pre-hospital Level of Mobility:  Ambulatory with assistive device a walker or cane  Patient Pre-hospital Diet Restrictions:  None    Substance Use History:   History   Alcohol Use No     History   Smoking Status    Never Smoker   Smokeless Tobacco    Never Used     Comment: non smoker      History   Drug Use No       Family History:    non-contributory    Physical Exam:     Vitals:   Blood Pressure: (!) 174/70 (10/23/18 0308)  Pulse: (!) 51 (10/23/18 0308)  Temperature: 98 3 °F (36 8 °C) (10/23/18 0148)  Temp Source: Oral (10/23/18 0148)  Respirations: 18 (10/23/18 0148)  Height: 5' 7" (170 2 cm) (10/23/18 0148)  Weight - Scale: 54 kg (119 lb 0 8 oz) (10/23/18 0148)  SpO2: 93 % (10/23/18 0148)    Physical Exam   Constitutional: She is oriented to person, place, and time  No distress  Cachectic elderly female   HENT:   Head: Normocephalic and atraumatic  Eyes: Pupils are equal, round, and reactive to light  No scleral icterus  Neck: Normal range of motion  Neck supple  No tracheal deviation present  Cardiovascular: Normal rate, normal heart sounds and intact distal pulses  Exam reveals no gallop and no friction rub  No murmur heard    Sinus bradycardia 55   Pulmonary/Chest: Effort normal and breath sounds normal  No stridor  No respiratory distress  She has no wheezes  She has no rales  She exhibits no tenderness  Abdominal: Soft  Bowel sounds are normal  She exhibits no distension and no mass  There is no tenderness  There is no rebound and no guarding  Musculoskeletal: She exhibits edema (Bilateral lower extremity pitting edema 2 +)  She exhibits no tenderness or deformity  Neurological: She is alert and oriented to person, place, and time  No cranial nerve deficit  Coordination normal    Skin: Skin is warm and dry  No rash noted  She is not diaphoretic  No erythema  No pallor  Psychiatric: She has a normal mood and affect  Her behavior is normal    Nursing note and vitals reviewed  Additional Data:     Lab Results: I have personally reviewed pertinent reports  Results from last 7 days  Lab Units 10/22/18  2317   WBC Thousand/uL 10 26*   HEMOGLOBIN g/dL 14 3   HEMATOCRIT % 44 6   PLATELETS Thousands/uL 192   NEUTROS ABS Thousands/µL 5 79   NEUTROS PCT % 56   LYMPHS PCT % 28   MONOS PCT % 10   EOS PCT % 5       Results from last 7 days  Lab Units 10/22/18  2317   SODIUM mmol/L 141   POTASSIUM mmol/L 4 4   CHLORIDE mmol/L 104   CO2 mmol/L 27   BUN mg/dL 24   CREATININE mg/dL 0 74   ANION GAP mmol/L 10   CALCIUM mg/dL 9 9   ALBUMIN g/dL 3 9   TOTAL BILIRUBIN mg/dL 0 50   ALK PHOS U/L 110   ALT U/L 20   AST U/L 19                       Imaging: I have personally reviewed pertinent reports  CT head without contrast   Final Result by Mauricio Leventhal, MD (10/23 0031)      No acute intracranial abnormality  Workstation performed: [id number]         XR chest 1 view portable   ED Interpretation by Gabo Cid MD (10/23 0005)   No acute disease  EKG, Pathology, and Other Studies Reviewed on Admission:     EKG:  Sinus bradycardia with T-wave inversion in inferior leads  · Vent   rate 50 BPM  · AL interval 160 ms  · QRS duration 76 ms  · QT/QTc 432/393 ms  · P-R-T axes * -23 1    Allscripts / Epic Records Reviewed: Yes     ** Please Note: This note has been constructed using a voice recognition system   **

## 2018-10-23 NOTE — SOCIAL WORK
Met with patient  Explained role of care management  Discussed OBSERVATION status and OBS notice signed  Patient lives in a one story home with her two nephews  [address]  She is independent adl's, uses cane to ambulate, drives, provides own meals  BEN[facility] - SAMARA clark  Past services - [facility] - d/c 10/4/18, current with [facility]  Patient plans on returning home at discharge and is agreeable to continuing with [facility]  Referral to [facility] to via [facility]  Await PT recommendation

## 2018-10-23 NOTE — ASSESSMENT & PLAN NOTE
Presented to emergency department for evaluation of dizziness after standing  Denies similar symptoms and reports that she is weaker when ambulating  There are no metabolic abnormalities or orthostatic tilt, possibly due to dehydration or vasovagal     - vital signs per unit  - 250 cc bolus now   - EKG shows sinus bradycardia, heart rate 50, , T-wave inversion in inferior leads  - CT of head no acute intracranial abnormalities    Patient presents with no focal neuro deficits  - continue to trend Neuro exam  - OOB with assistance  - BMP and CBC in am

## 2018-10-23 NOTE — ASSESSMENT & PLAN NOTE
Malnutrition Findings:       - cachectic advanced age female  - Consult nutrition       BMI Findings: Body mass index is 18 65 kg/m²

## 2018-10-23 NOTE — ASSESSMENT & PLAN NOTE
- heart rate in 50s, previously admission heart rate 60-65  - continue telemetry monitoring and trend vital signs  - hold metoprolol

## 2018-10-23 NOTE — ED NOTES
Pt assisted to bedside commode to attempt to provide urine sample     Maxx Peralta RN  10/22/18 [id number]

## 2018-10-23 NOTE — ASSESSMENT & PLAN NOTE
History of atrial fibrillation, No anticoagulation  -continue telemetry monitoring and treating vital signs

## 2018-10-23 NOTE — PLAN OF CARE
Problem: SAFETY ADULT  Goal: Patient will remain free of falls  INTERVENTIONS:  - Assess patient frequently for physical needs  -  Identify cognitive and physical deficits and behaviors that affect risk of falls    -  Mobile fall precautions as indicated by assessment   - Educate patient/family on patient safety including physical limitations  - Instruct patient to call for assistance with activity based on assessment  - Modify environment to reduce risk of injury  - Consider OT/PT consult to assist with strengthening/mobility  Outcome: Progressing

## 2018-10-23 NOTE — ED NOTES
Pt ambulated around unit to assess for symptoms   Patient stated that her head felt funny but it wasn't as bad as it was before     Gabo Woodruff RN  10/23/18 [id number]

## 2018-10-23 NOTE — ASSESSMENT & PLAN NOTE
-patient reports that she is no longer taking Lasix  -no signs of fluid overload or increased oxygen requirement  -echocardiogram on 09/11/18:  EF 60%, no regional wall abnormalities

## 2018-10-23 NOTE — ED PROVIDER NOTES
History  Chief Complaint   Patient presents with    Dizziness     pt presents to ER stating she has been feeling dizzy when she stands and weaker than normal      81 yo F with PMH of afib, HTN presents to ED via EMS from home for lightheadedness, which has mostly resolved  Pt was recently admitted to this hospital for rapid afib, was sent to rehab, and is now back at home  Has been in normal state of health, ate dinner, cleaned the dishes tonight, and sat down for awhile  When she stood up to go to the restroom, she felt very lightheaded and like she was going to fall  She steadied herself and called EMS  Denies any prodrome, or CP/SOB/HA/N/V since the incident  Feels better now, but not quite back to normal  Denies pain  Denies similar sx in past  Only recent med change was her lasix was stopped about a week ago due to the fact that she didn't like that it made her urinate so often (PCP d/c)  No recent fevers/chills/cough/illnesses  Walks with a cane, sometimes walker  History provided by:  Patient and medical records   used: No    Dizziness   Quality:  Lightheadedness  Severity:  Unable to specify  Onset quality:  Sudden  Timing:  Intermittent  Progression:  Partially resolved  Chronicity:  New  Context: standing up    Worsened by:  Standing up  Ineffective treatments:  None tried  Associated symptoms: no blood in stool, no chest pain, no diarrhea, no headaches, no hearing loss, no nausea, no palpitations, no shortness of breath, no syncope, no vision changes, no vomiting and no weakness    Risk factors: heart disease and multiple medications    Risk factors: no hx of stroke        Prior to Admission Medications   Prescriptions Last Dose Informant Patient Reported?  Taking?   acetaminophen (TYLENOL) 325 mg tablet   Yes No   Sig: Take 650 mg by mouth every 6 (six) hours as needed for mild pain   aspirin 81 MG tablet   Yes No   Sig: Take 1 tablet by mouth daily   bimatoprost (LUMIGAN) 0 01 % ophthalmic drops   Yes No   Sig: Administer 1 drop to both eyes daily at bedtime   docusate sodium (COLACE) 100 mg capsule   No No   Sig: Take 1 capsule (100 mg total) by mouth 2 (two) times a day   dorzolamide-timolol (COSOPT) 22 3-6 8 MG/ML ophthalmic solution   Yes No   Sig: Apply to eye Twice daily   ezetimibe (ZETIA) 10 mg tablet   No No   Sig: Take 1 tablet (10 mg total) by mouth daily   furosemide (LASIX) 20 mg tablet   No No   Sig: Take 1 tablet (20 mg total) by mouth daily   lisinopril (ZESTRIL) 20 mg tablet   No No   Sig: Take 1 tablet (20 mg total) by mouth daily   metoprolol tartrate (LOPRESSOR) 50 mg tablet   No No   Sig: Take 1 tablet (50 mg total) by mouth 2 (two) times a day   nystatin (MYCOSTATIN) powder   No No   Sig: Apply topically 2 (two) times a day      Facility-Administered Medications: None       Past Medical History:   Diagnosis Date    A-fib [facility])     last assessed 10/16/17    CHF (congestive heart failure) (HCC)     History of varicose veins     Hypertension        History reviewed  No pertinent surgical history  Family History   Problem Relation Age of Onset   Kemar Polio Cancer Mother     Hypertension Mother     Hypertension Father     Diabetes Sister     Colon cancer Family     Stroke Family         ischemic      I have reviewed and agree with the history as documented  Social History   Substance Use Topics    Smoking status: Never Smoker    Smokeless tobacco: Never Used      Comment: non smoker     Alcohol use No        Review of Systems   Constitutional: Negative for appetite change, chills, fatigue and fever  HENT: Negative for congestion, ear pain, hearing loss, rhinorrhea, sore throat, trouble swallowing and voice change  Eyes: Negative for pain and visual disturbance  Respiratory: Negative for cough, chest tightness and shortness of breath  Cardiovascular: Negative for chest pain, palpitations, leg swelling and syncope     Gastrointestinal: Negative for abdominal pain, blood in stool, constipation, diarrhea, nausea and vomiting  Genitourinary: Negative for difficulty urinating and hematuria  Musculoskeletal: Negative for back pain, neck pain and neck stiffness  Skin: Negative for rash  Neurological: Positive for light-headedness  Negative for dizziness, syncope, speech difficulty, weakness and headaches  Psychiatric/Behavioral: Negative for confusion and suicidal ideas  Physical Exam  Physical Exam   Constitutional: She is oriented to person, place, and time  She appears well-developed and well-nourished  No distress  HENT:   Head: Normocephalic and atraumatic  Right Ear: External ear normal    Left Ear: External ear normal    Nose: Nose normal    Mouth/Throat: Oropharynx is clear and moist    Eyes: Pupils are equal, round, and reactive to light  Conjunctivae and EOM are normal  Right eye exhibits no discharge  Left eye exhibits no discharge  No scleral icterus  Neck: Normal range of motion  Neck supple  No tracheal deviation present  Cardiovascular: Normal rate, regular rhythm, normal heart sounds and intact distal pulses  Exam reveals no gallop and no friction rub  No murmur heard  Pulmonary/Chest: Effort normal and breath sounds normal  No stridor  No respiratory distress  She exhibits no tenderness  Abdominal: Soft  Bowel sounds are normal  There is no tenderness  There is no rebound and no guarding  Musculoskeletal: Normal range of motion  She exhibits no edema or deformity  Lymphadenopathy:     She has no cervical adenopathy  Neurological: She is alert and oriented to person, place, and time  She has normal strength  No cranial nerve deficit or sensory deficit  Coordination normal    No nystagmus   Skin: Skin is warm and dry  No rash noted  She is not diaphoretic  Psychiatric: She has a normal mood and affect  Her behavior is normal    Nursing note and vitals reviewed        Vital Signs  ED Triage Vitals   Temperature Pulse Respirations Blood Pressure SpO2   10/22/18 2304 10/22/18 2304 10/22/18 2304 10/22/18 2306 10/22/18 2304   (!) 96 8 °F (36 °C) 56 18 (!) 196/90 97 %      Temp Source Heart Rate Source Patient Position - Orthostatic VS BP Location FiO2 (%)   10/22/18 2304 10/22/18 2304 10/22/18 2318 10/22/18 2318 --   Temporal Monitor Lying - Orthostatic VS Left arm       Pain Score       10/22/18 2304       No Pain           Vitals:    10/22/18 2321 10/22/18 2323 10/23/18 0000 10/23/18 0015   BP: (!) 181/84 (!) 181/81 (!) 178/74 (!) 195/72   Pulse: (!) 52 (!) 54 55 (!) 50   Patient Position - Orthostatic VS: Sitting - Orthostatic VS Standing - Orthostatic VS         Visual Acuity      ED Medications  Medications   lisinopril (ZESTRIL) tablet 10 mg (10 mg Oral Given 10/23/18 0001)   cephalexin (KEFLEX) capsule 500 mg (500 mg Oral Given 10/23/18 0054)       Diagnostic Studies  Results Reviewed     Procedure Component Value Units Date/Time    Urine Microscopic [[id number]]  (Abnormal) Collected:  10/22/18 2328    Lab Status:  Final result Specimen:  Urine from Urine, Clean Catch Updated:  10/23/18 0045     RBC, UA 1-2 (A) /hpf      WBC, UA 4-10 (A) /hpf      Epithelial Cells Occasional /hpf      Bacteria, UA Occasional /hpf     Troponin I [[id number]]  (Normal) Collected:  10/22/18 2317    Lab Status:  Final result Specimen:  Blood from Arm, Right Updated:  10/23/18 0003     Troponin I <0 02 ng/mL     Comprehensive metabolic panel [[id number]] Collected:  10/22/18 2317    Lab Status:  Final result Specimen:  Blood from Arm, Right Updated:  10/22/18 2358     Sodium 141 mmol/L      Potassium 4 4 mmol/L      Chloride 104 mmol/L      CO2 27 mmol/L      ANION GAP 10 mmol/L      BUN 24 mg/dL      Creatinine 0 74 mg/dL      Glucose 104 mg/dL      Calcium 9 9 mg/dL      AST 19 U/L      ALT 20 U/L      Alkaline Phosphatase 110 U/L      Total Protein 7 6 g/dL      Albumin 3 9 g/dL      Total Bilirubin 0 50 mg/dL      eGFR 71 ml/min/1 73sq m Narrative:         National Kidney Disease Education Program recommendations are as follows:  GFR calculation is accurate only with a steady state creatinine  Chronic Kidney disease less than 60 ml/min/1 73 sq  meters  Kidney failure less than 15 ml/min/1 73 sq  meters  Magnesium [[id number]]  (Normal) Collected:  10/22/18 2317    Lab Status:  Final result Specimen:  Blood from Arm, Right Updated:  10/22/18 2358     Magnesium 2 3 mg/dL     UA w Reflex to Microscopic w Reflex to Culture [[id number]]  (Abnormal) Collected:  10/22/18 2328    Lab Status:  Final result Specimen:  Urine from Urine, Clean Catch Updated:  10/22/18 2337     Color, UA Yellow     Clarity, UA Slightly Cloudy     Specific Gravity, UA 1 015     pH, UA 6 5     Leukocytes, UA Small (A)     Nitrite, UA Negative     Protein, UA Negative mg/dl      Glucose, UA Negative mg/dl      Ketones, UA 15 (1+) (A) mg/dl      Urobilinogen, UA 1 0 E U /dl      Bilirubin, UA Negative     Blood, UA Trace-Intact (A)    CBC and differential [[id number]]  (Abnormal) Collected:  10/22/18 2317    Lab Status:  Final result Specimen:  Blood from Arm, Right Updated:  10/22/18 2336     WBC 10 26 (H) Thousand/uL      RBC 4 81 Million/uL      Hemoglobin 14 3 g/dL      Hematocrit 44 6 %      MCV 93 fL      MCH 29 7 pg      MCHC 32 1 g/dL      RDW 14 5 %      MPV 12 1 fL      Platelets 995 Thousands/uL      nRBC 0 /100 WBCs      Neutrophils Relative 56 %      Immat GRANS % 0 %      Lymphocytes Relative 28 %      Monocytes Relative 10 %      Eosinophils Relative 5 %      Basophils Relative 1 %      Neutrophils Absolute 5 79 Thousands/µL      Immature Grans Absolute 0 04 Thousand/uL      Lymphocytes Absolute 2 86 Thousands/µL      Monocytes Absolute 1 02 Thousand/µL      Eosinophils Absolute 0 47 Thousand/µL      Basophils Absolute 0 08 Thousands/µL                  CT head without contrast   Final Result by Lizeth Marie MD (10/23 0031)      No acute intracranial abnormality  Workstation performed: [id number]         XR chest 1 view portable   ED Interpretation by Ander Pressley MD (10/23 0005)   No acute disease  Procedures  ECG 12 Lead Documentation  Date/Time: 10/22/2018 11:21 PM  Performed by: Sue Salvador  Authorized by: Sue Salvador     Indications / Diagnosis:  Dizzy  ECG reviewed by me, the ED Provider: yes    Patient location:  ED  Previous ECG:     Previous ECG:  Compared to current    Comparison ECG info:  Sinus ivory has replaced rapid afib    Similarity:  Changes noted    Comparison to cardiac monitor: Yes    Interpretation:     Interpretation: abnormal    Quality:     Tracing quality:  Limited by artifact  Rate:     ECG rate:  50    ECG rate assessment: bradycardic    Rhythm:     Rhythm: sinus bradycardia    Ectopy:     Ectopy: none    QRS:     QRS axis:  Left    QRS intervals:  Normal  Conduction:     Conduction: normal    ST segments:     ST segments:  Non-specific  T waves:     T waves: non-specific             Phone Contacts  ED Phone Contact    ED Course  ED Course as of Oct 23 0057   Mon Oct 22, 2018   2329 Pt not orthostatic     2344 Exam as noted, pt A&Ox4, no distress, no focal neuro findings  States she doesn't feel quite right, but is no longer lightheaded  Suspect sx due to vasovagal, however given advanced age will check labs, CXR, CTH  Of note - pt is bradycardic, in review of records it seems she has been in 60s in past      Tue Oct 23, 2018   0035 [facility] neg  0040 Will trial ambulation  Urine micro pending  0056 Pt feels unsteady still while ambulating  States she has a "funny feeling" in her head  Will give dose abx for poss UTI, and discussed with Lázaro Villanueva, accepted for obs for weakness and inability to ambulate normally, with UTI                                   MDM  Number of Diagnoses or Management Options  Bradycardia: established and worsening  Dizziness: new and requires workup  Hypertension: established and worsening  UTI (urinary tract infection): new and requires workup  Weakness: new and requires workup     Amount and/or Complexity of Data Reviewed  Clinical lab tests: ordered and reviewed  Tests in the radiology section of CPT®: ordered and reviewed  Tests in the medicine section of CPT®: ordered and reviewed  Review and summarize past medical records: yes  Discuss the patient with other providers: yes  Independent visualization of images, tracings, or specimens: yes    Risk of Complications, Morbidity, and/or Mortality  Presenting problems: moderate  Diagnostic procedures: low  Management options: low    Patient Progress  Patient progress: improved    CritCare Time    Disposition  Final diagnoses:   Dizziness   Hypertension   Bradycardia   UTI (urinary tract infection)   Weakness     Time reflects when diagnosis was documented in both MDM as applicable and the Disposition within this note     Time User Action Codes Description Comment    10/22/2018 11:47 PM Elsworth Bonds Add [R42] Dizziness     10/22/2018 11:47 PM Alejandro Brands S Add [I10] Hypertension     10/22/2018 11:47 PM Alejandro Brands S Add [R00 1] Bradycardia     10/23/2018 12:53 AM Alejandro Brands S Add [N39 0] UTI (urinary tract infection)     10/23/2018 12:55 AM Alejandro Brands S Add [R53 1] Weakness       ED Disposition     ED Disposition Condition Comment    Admit  Case was discussed with Ashok Zhang and the patient's admission status was agreed to be Admission Status: observation status to the service of Dr Miller Conklin   Follow-up Information    None         Patient's Medications   Discharge Prescriptions    No medications on file     No discharge procedures on file      ED Provider  Electronically Signed by           Yao Quiroga MD  10/23/18 7855

## 2018-10-23 NOTE — ASSESSMENT & PLAN NOTE
- urinalysis complete, negative for nitrates epithelia cells likely contamination  Given 1 time dose of Keflex in the ED  Patient currently denies dysuria or urinary frequency, will not continue antibiotics at this time

## 2018-10-24 VITALS
SYSTOLIC BLOOD PRESSURE: 161 MMHG | BODY MASS INDEX: 19.38 KG/M2 | DIASTOLIC BLOOD PRESSURE: 69 MMHG | RESPIRATION RATE: 16 BRPM | HEIGHT: 67 IN | WEIGHT: 123.46 LBS | OXYGEN SATURATION: 95 % | HEART RATE: 65 BPM | TEMPERATURE: 95.9 F

## 2018-10-24 PROBLEM — R55 PRE-SYNCOPE: Status: RESOLVED | Noted: 2018-10-23 | Resolved: 2018-10-24

## 2018-10-24 PROBLEM — R53.1 WEAKNESS: Status: RESOLVED | Noted: 2018-10-23 | Resolved: 2018-10-24

## 2018-10-24 PROBLEM — R00.1 BRADYCARDIA: Status: RESOLVED | Noted: 2018-10-23 | Resolved: 2018-10-24

## 2018-10-24 LAB
ANION GAP SERPL CALCULATED.3IONS-SCNC: 4 MMOL/L (ref 4–13)
BUN SERPL-MCNC: 21 MG/DL (ref 5–25)
CALCIUM SERPL-MCNC: 8.8 MG/DL (ref 8.3–10.1)
CHLORIDE SERPL-SCNC: 107 MMOL/L (ref 100–108)
CO2 SERPL-SCNC: 30 MMOL/L (ref 21–32)
CREAT SERPL-MCNC: 0.59 MG/DL (ref 0.6–1.3)
GFR SERPL CREATININE-BSD FRML MDRD: 80 ML/MIN/1.73SQ M
GLUCOSE SERPL-MCNC: 96 MG/DL (ref 65–140)
POTASSIUM SERPL-SCNC: 4.3 MMOL/L (ref 3.5–5.3)
SODIUM SERPL-SCNC: 141 MMOL/L (ref 136–145)

## 2018-10-24 PROCEDURE — G8980 MOBILITY D/C STATUS: HCPCS

## 2018-10-24 PROCEDURE — G8988 SELF CARE GOAL STATUS: HCPCS

## 2018-10-24 PROCEDURE — G8978 MOBILITY CURRENT STATUS: HCPCS

## 2018-10-24 PROCEDURE — G8989 SELF CARE D/C STATUS: HCPCS

## 2018-10-24 PROCEDURE — G8979 MOBILITY GOAL STATUS: HCPCS

## 2018-10-24 PROCEDURE — 97163 PT EVAL HIGH COMPLEX 45 MIN: CPT

## 2018-10-24 PROCEDURE — 97166 OT EVAL MOD COMPLEX 45 MIN: CPT

## 2018-10-24 PROCEDURE — G8987 SELF CARE CURRENT STATUS: HCPCS

## 2018-10-24 PROCEDURE — 80048 BASIC METABOLIC PNL TOTAL CA: CPT | Performed by: INTERNAL MEDICINE

## 2018-10-24 PROCEDURE — 99217 PR OBSERVATION CARE DISCHARGE MANAGEMENT: CPT | Performed by: INTERNAL MEDICINE

## 2018-10-24 RX ORDER — AMLODIPINE BESYLATE 5 MG/1
5 TABLET ORAL DAILY
Qty: 30 TABLET | Refills: 5 | Status: SHIPPED | OUTPATIENT
Start: 2018-10-25 | End: 2019-04-04 | Stop reason: SDUPTHER

## 2018-10-24 RX ADMIN — ASPIRIN 81 MG 81 MG: 81 TABLET ORAL at 08:55

## 2018-10-24 RX ADMIN — HEPARIN SODIUM 5000 UNITS: 5000 INJECTION INTRAVENOUS; SUBCUTANEOUS at 06:28

## 2018-10-24 RX ADMIN — DORZOLAMIDE HYDROCHLORIDE AND TIMOLOL MALEATE 1 DROP: 20; 5 SOLUTION/ DROPS OPHTHALMIC at 08:55

## 2018-10-24 RX ADMIN — LISINOPRIL 20 MG: 20 TABLET ORAL at 08:55

## 2018-10-24 RX ADMIN — DOCUSATE SODIUM 100 MG: 100 CAPSULE, LIQUID FILLED ORAL at 08:55

## 2018-10-24 RX ADMIN — EZETIMIBE 10 MG: 10 TABLET ORAL at 08:55

## 2018-10-24 RX ADMIN — AMLODIPINE BESYLATE 5 MG: 5 TABLET ORAL at 08:55

## 2018-10-24 NOTE — SOCIAL WORK
As  Per Dr Maty Rivas, patient for discharge home today with [facility]NASL's following  She has 2 adult nephews who live with her and assist with her care  Spoke with Lamine Sandy, her nephew and he will transport her home at 1 pm today  Notified patient and Adonis, her nurse

## 2018-10-24 NOTE — PLAN OF CARE

## 2018-10-24 NOTE — OCCUPATIONAL THERAPY NOTE
Occupational Therapy Evaluation      Earl Tomas    10/24/2018    Patient Active Problem List   Diagnosis    Atrial fibrillation ([facility] )    Other hyperlipidemia    Essential hypertension    Advanced age   [facility] Noncompliance with medication regimen    Fungal dermatitis    Chronic diastolic congestive heart failure (HCC)    Non-ST elevation myocardial infarction (NSTEMI) ([facility] )    Vascular dementia with behavior disturbance       Past Medical History:   Diagnosis Date    A-fib [facility])     last assessed 10/16/17    CHF (congestive heart failure) ([facility] )     History of varicose veins     Hypertension        History reviewed  No pertinent surgical history  10/24/18 1210   Note Type   Note type Eval only   Restrictions/Precautions   Weight Bearing Precautions Per Order No   Other Precautions Hard of hearing; Fall Risk   Pain Assessment   Pain Assessment No/denies pain   Pain Score No Pain   Home Living   Type of [address] Able to live on main level with bedroom/bathroom;Stairs to enter with rails  (5STE)   Bathroom Shower/Tub Tub/shower unit   Bathroom Toilet Standard   Bathroom Equipment Shower chair;Grab bars in shower   Bathroom Accessibility Accessible   Home Equipment Walker;Cane   Additional Comments Pt lives in [facility] with basement and attic with 5STE, with 2 nephews  Bathes in tub with seat and grab bars  Prior Function   Level of [place] Independent with ADLs and functional mobility; Needs assistance with IADLs  (Nephews help with laundry, cleaning, bringing in groceries)   Lives With Family  (two nephews)   Receives Help From Family   ADL Assistance Independent   IADLs Needs assistance   Falls in the last 6 months 0   Vocational Retired   Comments nephews help with lifting and retrieval tasks, laundry, cleaning   Lifestyle   Autonomy Pt independent in ADLs and drives      Reciprocal Relationships Nephews live at home and help with IADLs   Intrinsic Gratification Pt likes to read the paper   Psychosocial   Psychosocial (WDL) WDL   Subjective   Subjective "I guess they're not letting me go home until after lunch"   ADL   Where Assessed Standing at sink   Eating Assistance 7  Independent   Grooming Assistance 7  Independent   UB Bathing Assistance 6  Modified Independent   LB Bathing Assistance 6  Modified Independent   UB Dressing Assistance 6  Modified independent   LB Dressing Assistance 6  Modified independent   Toileting Assistance  6  Modified independent   Transfers   Sit to Stand 6  Modified independent   Stand to Sit 6  Modified independent   Stand pivot 6  Modified independent   Toilet transfer 6  Modified independent   Functional Mobility   Functional Mobility 6  Modified independent   Additional Comments Pt uses SPC, educated they she may benefit from use of RW until dizziness resolves  Additional items Rolling walker   Balance   Static Sitting Good   Dynamic Sitting Good   Static Standing Good   Dynamic Standing Fair +   Activity Tolerance   Activity Tolerance Patient tolerated treatment well   Nurse [facility] to see per Mingo Waldrop RN   RUE Assessment   RUE Assessment WFL   LUE Assessment   LUE Assessment WFL   Hand Function   Gross Motor Coordination Functional   Fine Motor Coordination Functional   Cognition   Overall Cognitive Status WFL   Arousal/Participation Alert   Attention Within functional limits   Orientation Level Oriented X4   Memory (Patient able to recall fall history years back)   Following Commands Follows one step commands with increased time or repetition  (increased time secondary to [facility])   Comments Per dietician, patient poor historian regarding cooking/diet  Assessment   Limitation (Pt with occasional dizziness during functional mobility  )   Prognosis Good   Assessment Pt is a 80 y o  female seen for OT evaluation s/p admit to [facility] on 10/22/2018 w/ Pre-syncope    Comorbidities affecting pt's functional performance at time of assessment include: A-fib, CHF, HTN  Personal factors affecting pt at time of IE include:steps to enter environment and health management   Prior to admission, pt was independent in all ADLs, drove and received assistance with home management (laundry, etc) from 2 nephews, who live with her  Upon evaluation: Pt requires no assistance with ADLs, and is modified I for functional mobility  Patient is functioning close to or at baseline  From OT standpoint, recommendation at time of d/c would be home with family support      Goals   Patient Goals "I want to go home and sit on my chair and catch up on my papers "    Recommendation   OT Discharge Recommendation Home with family support   OT - OK to Discharge Yes   Barthel Index   Feeding 10   Bathing 5   Grooming Score 5   Dressing Score 10   Bladder Score 10   Bowels Score 10   Toilet Use Score 10   Transfers (Bed/Chair) Score 15   Mobility (Level Surface) Score 15   Stairs Score 0  (not assessed  )   Barthel Index Score 90     Bobby Habermann, MOT, OTR/L

## 2018-10-24 NOTE — MALNUTRITION/BMI
This medical record reflects one or more clinical indicators suggestive of malnutrition and/or morbid obesity  Malnutrition Findings:   Malnutrition type: Chronic illness  Degree of Malnutrition: Malnutrition of moderate degree  Malnutrition Characteristics: Fat loss, Muscle loss, Weight loss (Pt presents will 11% involuntary wt loss in 11 months, clavicle protrusion and orbital hollowing  ) Treat with diet and supplements  BMI Findings: Body mass index is 19 34 kg/m²  See Nutrition note dated 10/23/2018 for additional details  Completed nutrition assessment is viewable in the nutrition documentation

## 2018-10-24 NOTE — DISCHARGE SUMMARY
Discharge Summary - Vimal Tomas 80 y o  female MRN: [id number]    Unit/Bed#: [address] [place] [address] Encounter: [id number]    Admission Date: 10/22/2018     Admitting Diagnosis: Dizziness [R42]  Bradycardia [R00 1]  UTI (urinary tract infection) [N39 0]  Weakness [R53 1]  Hypertension [I8    HPI 55-year-old female admitted with episode of near syncope felt to be possible vasovagal with dizziness and bradycardia on presentation  Consults    Procedures Performed:   Orders Placed This Encounter   Procedures    ED ECG Documentation Only       Hospital Course:  Patient was admitted and her beta-blocker meds were held  She does have chronic atrial fibrillation with bradycardia noted on presentation  She did have hypertension and amlodipine was started in addition to her ACE-inhibitor for blood pressure control  She had a few white cells in her urine but a urine culture was not sent and she is not having any symptoms of dysuria and therefore was  Not started on any antibiotic therapy    She had improvement in her overall functional status was felt to be stable for discharge to home on October 24, 2018  Significant Findings, Care, Treatment and Services Provided:   General appearance: alert  Neck: no adenopathy, no JVD, supple, symmetrical, trachea midline and thyroid not enlarged, symmetric, no tenderness/mass/nodules  Lungs: clear to auscultation bilaterally  Heart:  Regular rhythm currently at 89 beats per minute was down to 58 overnight  Abdomen: soft, non-tender; bowel sounds normal; no masses,  no organomegaly  Extremities: extremities normal, warm and well-perfused; no cyanosis, clubbing, or edema  Skin: Skin color, texture, turgor normal  No rashes or lesions  Neurologic:  No new changes       Complications: none  Discharge Diagnosis: Active Problems:    Atrial fibrillation (HCC)    Essential hypertension    Chronic diastolic congestive heart failure ([address] )        Condition at Discharge: good     Discharge instructions/Information to patient and family:   See after visit summary for information provided to patient and family  Provisions for Follow-Up Care:  See after visit summary for information related to follow-up care and any pertinent home health orders  Disposition: Home with VNA    Planned Readmission: No    Discharge Statement   I spent 25 minutes discharging the patient  This time was spent on the day of discharge  I had direct contact with the patient on the day of discharge  Discharge Medications:  See after visit summary for reconciled discharge medications provided to patient and family          Ernesto Harmon DO

## 2018-10-31 ENCOUNTER — OFFICE VISIT (OUTPATIENT)
Dept: FAMILY MEDICINE CLINIC | Facility: HOSPITAL | Age: 83
End: 2018-10-31
Payer: MEDICARE

## 2018-10-31 VITALS
DIASTOLIC BLOOD PRESSURE: 66 MMHG | BODY MASS INDEX: 18.68 KG/M2 | SYSTOLIC BLOOD PRESSURE: 130 MMHG | WEIGHT: 119 LBS | HEART RATE: 100 BPM | OXYGEN SATURATION: 98 % | HEIGHT: 67 IN

## 2018-10-31 DIAGNOSIS — I48.19 PERSISTENT ATRIAL FIBRILLATION (HCC): Chronic | ICD-10-CM

## 2018-10-31 DIAGNOSIS — I50.32 CHRONIC DIASTOLIC CONGESTIVE HEART FAILURE (HCC): Chronic | ICD-10-CM

## 2018-10-31 DIAGNOSIS — F01.51 VASCULAR DEMENTIA WITH BEHAVIOR DISTURBANCE (HCC): ICD-10-CM

## 2018-10-31 DIAGNOSIS — I10 ESSENTIAL HYPERTENSION: Chronic | ICD-10-CM

## 2018-10-31 DIAGNOSIS — R54 ADVANCED AGE: Primary | ICD-10-CM

## 2018-10-31 DIAGNOSIS — I21.4 NON-ST ELEVATION MYOCARDIAL INFARCTION (NSTEMI) (HCC): ICD-10-CM

## 2018-10-31 PROBLEM — B36.9 FUNGAL DERMATITIS: Chronic | Status: RESOLVED | Noted: 2018-09-11 | Resolved: 2018-10-31

## 2018-10-31 PROCEDURE — 99214 OFFICE O/P EST MOD 30 MIN: CPT | Performed by: INTERNAL MEDICINE

## 2018-10-31 NOTE — PATIENT INSTRUCTIONS
You have been seen today for a hospital discharge follow up visit  The purpose of the visit is to be sure that you are feeling well and taking all medications as ordered  This visit is scheduled so that any post hospital questions you have can be addressed  The doctor or nurse will review all medications and will provide refills of medications  You may be asked to get some follow up labs or other testing done, please do this as soon as able  If  You are seeing a specialist for follow up, let us know so we can do appropriate referrals  Schedule your next routine visit today so that we can keep you on track and healthy  No changes to regimen

## 2018-10-31 NOTE — PROGRESS NOTES
Subjective:   No chief complaint on file  Patient ID: Brayden Gregory is a 80 y o  female  Feeling much better  Able to relate details of her hospital stay  Was feeling dizzy  Now off metoprolol and sx are gone  Sleep is good  Appears less stressed and anxious  Cautioned about driving, currently she is not driving  The following portions of the patient's history were reviewed and updated as appropriate: allergies, current medications, past family history, past medical history, past social history, past surgical history and problem list     Review of Systems   Constitutional: Negative for fatigue and fever  HENT: Negative for hearing loss  Eyes: Negative for visual disturbance  Respiratory: Negative for cough, chest tightness, shortness of breath and wheezing  Cardiovascular: Negative for chest pain, palpitations and leg swelling  Gastrointestinal: Negative for abdominal pain, diarrhea and nausea  Genitourinary: Negative for dysuria and hematuria  Musculoskeletal: Negative for arthralgias  Neurological: Negative for dizziness, numbness and headaches  Psychiatric/Behavioral: Negative for confusion and dysphoric mood  All other systems reviewed and are negative          Current Outpatient Prescriptions on File Prior to Visit   Medication Sig Dispense Refill    acetaminophen (TYLENOL) 325 mg tablet Take 650 mg by mouth every 6 (six) hours as needed for mild pain      amLODIPine (NORVASC) 5 mg tablet Take 1 tablet (5 mg total) by mouth daily 30 tablet 5    aspirin 81 MG tablet Take 1 tablet by mouth daily      bimatoprost (LUMIGAN) 0 01 % ophthalmic drops Administer 1 drop to both eyes daily at bedtime      docusate sodium (COLACE) 100 mg capsule Take 1 capsule (100 mg total) by mouth 2 (two) times a day 10 capsule 0    dorzolamide-timolol (COSOPT) 22 3-6 8 MG/ML ophthalmic solution Apply to eye Twice daily      ezetimibe (ZETIA) 10 mg tablet Take 1 tablet (10 mg total) by mouth daily 30 tablet 5    furosemide (LASIX) 20 mg tablet Take 1 tablet (20 mg total) by mouth daily  0    lisinopril (ZESTRIL) 20 mg tablet Take 1 tablet (20 mg total) by mouth daily 30 tablet 5     No current facility-administered medications on file prior to visit  Objective: There were no vitals filed for this visit  Physical Exam      Assessment/Plan:    Atrial fibrillation (HCC)  Due to bradycardia in hospital, beta blocker held and amlodipine added for HTN          Diagnoses and all orders for this visit:    Advanced age    Vascular dementia with behavior disturbance    Essential hypertension    Non-ST elevation myocardial infarction (NSTEMI) ([facility] [address] )    Persistent atrial fibrillation ([facility] [address] )

## 2018-12-08 DIAGNOSIS — E78.5 HYPERLIPIDEMIA, UNSPECIFIED HYPERLIPIDEMIA TYPE: ICD-10-CM

## 2018-12-08 RX ORDER — EZETIMIBE 10 MG/1
TABLET ORAL
Qty: 30 TABLET | Refills: 5 | Status: SHIPPED | OUTPATIENT
Start: 2018-12-08 | End: 2018-12-21 | Stop reason: SDUPTHER

## 2018-12-21 DIAGNOSIS — E78.5 HYPERLIPIDEMIA, UNSPECIFIED HYPERLIPIDEMIA TYPE: ICD-10-CM

## 2018-12-21 RX ORDER — EZETIMIBE 10 MG/1
10 TABLET ORAL DAILY
Qty: 90 TABLET | Refills: 3 | Status: SHIPPED | OUTPATIENT
Start: 2018-12-21 | End: 2019-01-03

## 2019-01-03 ENCOUNTER — OFFICE VISIT (OUTPATIENT)
Dept: FAMILY MEDICINE CLINIC | Facility: HOSPITAL | Age: 84
End: 2019-01-03
Payer: MEDICARE

## 2019-01-03 VITALS
WEIGHT: 119 LBS | OXYGEN SATURATION: 98 % | SYSTOLIC BLOOD PRESSURE: 150 MMHG | HEART RATE: 71 BPM | DIASTOLIC BLOOD PRESSURE: 64 MMHG | BODY MASS INDEX: 18.68 KG/M2 | HEIGHT: 67 IN

## 2019-01-03 DIAGNOSIS — R54 ADVANCED AGE: ICD-10-CM

## 2019-01-03 DIAGNOSIS — I21.4 NON-ST ELEVATION MYOCARDIAL INFARCTION (NSTEMI) (HCC): ICD-10-CM

## 2019-01-03 DIAGNOSIS — I10 ESSENTIAL HYPERTENSION: Primary | Chronic | ICD-10-CM

## 2019-01-03 PROBLEM — Z91.14 NONCOMPLIANCE WITH MEDICATION REGIMEN: Status: RESOLVED | Noted: 2018-09-11 | Resolved: 2019-01-03

## 2019-01-03 PROCEDURE — 99214 OFFICE O/P EST MOD 30 MIN: CPT | Performed by: INTERNAL MEDICINE

## 2019-01-03 NOTE — PATIENT INSTRUCTIONS
You were seen for a follow up of chronic medical problems today  Be sure to make any changes to your medication as discussed by your doctor  If blood tests or other testing was ordered, be sure to obtain this at the time requested by the doctor  Our office will call you with the results of your tests once they arrive in our office  Stop zetia (ezetemibe)  No need to continue this  Continue other medications as ordered for blood pressure

## 2019-01-03 NOTE — PROGRESS NOTES
Subjective:   Chief Complaint   Patient presents with    Follow-up     2 mo        Patient ID: Lillian Marcos is a 80 y o  female  Agitated and confused  Rambling about bank statements being stolen and calls to the bank with no result  Relates seeing "a strange girl" standing around her house  Girl disappears acc to the patient  Can name her medications  Says she gets help from her nephew who lives with her  The following portions of the patient's history were reviewed and updated as appropriate: allergies, current medications, past family history, past medical history, past social history, past surgical history and problem list     Review of Systems   Constitutional: Negative for fatigue and fever  HENT: Negative for hearing loss  Eyes: Negative for visual disturbance  Respiratory: Negative for cough, chest tightness, shortness of breath and wheezing  Cardiovascular: Negative for chest pain, palpitations and leg swelling  Gastrointestinal: Negative for abdominal pain, diarrhea and nausea  Genitourinary: Negative for dysuria and hematuria  Musculoskeletal: Negative for arthralgias  Neurological: Negative for dizziness, numbness and headaches  Psychiatric/Behavioral: Negative for confusion and dysphoric mood  All other systems reviewed and are negative          Current Outpatient Prescriptions on File Prior to Visit   Medication Sig Dispense Refill    acetaminophen (TYLENOL) 325 mg tablet Take 650 mg by mouth every 6 (six) hours as needed for mild pain      amLODIPine (NORVASC) 5 mg tablet Take 1 tablet (5 mg total) by mouth daily 30 tablet 5    aspirin 81 MG tablet Take 1 tablet by mouth daily      bimatoprost (LUMIGAN) 0 01 % ophthalmic drops Administer 1 drop to both eyes daily at bedtime      docusate sodium (COLACE) 100 mg capsule Take 1 capsule (100 mg total) by mouth 2 (two) times a day 10 capsule 0    dorzolamide-timolol (COSOPT) 22 3-6 8 MG/ML ophthalmic solution Apply to eye Twice daily      lisinopril (ZESTRIL) 20 mg tablet Take 1 tablet (20 mg total) by mouth daily 30 tablet 5    [DISCONTINUED] ezetimibe (ZETIA) 10 mg tablet Take 1 tablet (10 mg total) by mouth daily 90 tablet 3     No current facility-administered medications on file prior to visit  Objective:  Vitals:    01/03/19 1415   BP: 150/64   Pulse: 71   SpO2: 98%   Weight: 54 kg (119 lb)   Height: 5' 7" (1 702 m)      Physical Exam   Constitutional: She is oriented to person, place, and time  She appears well-developed and well-nourished  Eyes: Pupils are equal, round, and reactive to light  Neck: Normal range of motion  Neck supple  No thyromegaly present  Cardiovascular: Normal rate, regular rhythm, normal heart sounds and intact distal pulses  No murmur heard  Pulmonary/Chest: Effort normal and breath sounds normal  She has no wheezes  She has no rales  Abdominal: Soft  Bowel sounds are normal  There is no tenderness  Musculoskeletal: Normal range of motion  She exhibits no edema, tenderness or deformity  Lymphadenopathy:     She has no cervical adenopathy  Neurological: She is alert and oriented to person, place, and time  She has normal reflexes  Skin: Skin is warm and dry  Psychiatric: She has a normal mood and affect  Nursing note and vitals reviewed  Assessment/Plan:    No problem-specific Assessment & Plan notes found for this encounter         Diagnoses and all orders for this visit:    Essential hypertension    Advanced age    Non-ST elevation myocardial infarction (NSTEMI) ([facility] [address] )

## 2019-01-09 DIAGNOSIS — I10 ESSENTIAL HYPERTENSION: ICD-10-CM

## 2019-01-09 RX ORDER — LISINOPRIL 20 MG/1
TABLET ORAL
Qty: 30 TABLET | Refills: 5 | Status: SHIPPED | OUTPATIENT
Start: 2019-01-09 | End: 2019-05-15 | Stop reason: SDUPTHER

## 2019-04-04 ENCOUNTER — OFFICE VISIT (OUTPATIENT)
Dept: FAMILY MEDICINE CLINIC | Facility: HOSPITAL | Age: 84
End: 2019-04-04
Payer: MEDICARE

## 2019-04-04 VITALS
BODY MASS INDEX: 18.44 KG/M2 | HEART RATE: 86 BPM | WEIGHT: 117.5 LBS | SYSTOLIC BLOOD PRESSURE: 132 MMHG | HEIGHT: 67 IN | DIASTOLIC BLOOD PRESSURE: 68 MMHG | OXYGEN SATURATION: 98 %

## 2019-04-04 DIAGNOSIS — Z00.00 HEALTH CARE MAINTENANCE: Primary | ICD-10-CM

## 2019-04-04 DIAGNOSIS — F01.51 VASCULAR DEMENTIA WITH BEHAVIOR DISTURBANCE (HCC): ICD-10-CM

## 2019-04-04 DIAGNOSIS — R55 PRE-SYNCOPE: ICD-10-CM

## 2019-04-04 DIAGNOSIS — I48.19 PERSISTENT ATRIAL FIBRILLATION (HCC): ICD-10-CM

## 2019-04-04 DIAGNOSIS — I10 ESSENTIAL HYPERTENSION: Chronic | ICD-10-CM

## 2019-04-04 DIAGNOSIS — I21.4 NON-ST ELEVATION MYOCARDIAL INFARCTION (NSTEMI) (HCC): ICD-10-CM

## 2019-04-04 DIAGNOSIS — R54 ADVANCED AGE: ICD-10-CM

## 2019-04-04 PROCEDURE — G0439 PPPS, SUBSEQ VISIT: HCPCS | Performed by: INTERNAL MEDICINE

## 2019-04-04 PROCEDURE — 99214 OFFICE O/P EST MOD 30 MIN: CPT | Performed by: INTERNAL MEDICINE

## 2019-04-04 RX ORDER — AMLODIPINE BESYLATE 5 MG/1
5 TABLET ORAL DAILY
Qty: 30 TABLET | Refills: 5 | Status: SHIPPED | OUTPATIENT
Start: 2019-04-04 | End: 2019-08-06 | Stop reason: SDUPTHER

## 2019-05-15 DIAGNOSIS — I10 ESSENTIAL HYPERTENSION: ICD-10-CM

## 2019-05-15 RX ORDER — LISINOPRIL 20 MG/1
20 TABLET ORAL DAILY
Qty: 90 TABLET | Refills: 3 | Status: SHIPPED | OUTPATIENT
Start: 2019-05-15 | End: 2019-07-02 | Stop reason: SDUPTHER

## 2019-07-02 DIAGNOSIS — I10 ESSENTIAL HYPERTENSION: ICD-10-CM

## 2019-07-02 RX ORDER — LISINOPRIL 20 MG/1
20 TABLET ORAL DAILY
Qty: 90 TABLET | Refills: 3 | Status: SHIPPED | OUTPATIENT
Start: 2019-07-02 | End: 2019-08-06 | Stop reason: SDUPTHER

## 2019-08-06 ENCOUNTER — OFFICE VISIT (OUTPATIENT)
Dept: FAMILY MEDICINE CLINIC | Facility: HOSPITAL | Age: 84
End: 2019-08-06
Payer: MEDICARE

## 2019-08-06 VITALS
BODY MASS INDEX: 17.96 KG/M2 | HEART RATE: 80 BPM | WEIGHT: 114.4 LBS | SYSTOLIC BLOOD PRESSURE: 122 MMHG | HEIGHT: 67 IN | OXYGEN SATURATION: 98 % | DIASTOLIC BLOOD PRESSURE: 60 MMHG

## 2019-08-06 DIAGNOSIS — F01.51 VASCULAR DEMENTIA WITH BEHAVIOR DISTURBANCE (HCC): ICD-10-CM

## 2019-08-06 DIAGNOSIS — I10 ESSENTIAL HYPERTENSION: Chronic | ICD-10-CM

## 2019-08-06 DIAGNOSIS — R54 ADVANCED AGE: ICD-10-CM

## 2019-08-06 DIAGNOSIS — I21.4 NON-ST ELEVATION MYOCARDIAL INFARCTION (NSTEMI) (HCC): Primary | ICD-10-CM

## 2019-08-06 DIAGNOSIS — R55 PRE-SYNCOPE: ICD-10-CM

## 2019-08-06 PROBLEM — Z00.00 HEALTH CARE MAINTENANCE: Status: RESOLVED | Noted: 2019-04-04 | Resolved: 2019-08-06

## 2019-08-06 PROCEDURE — 99214 OFFICE O/P EST MOD 30 MIN: CPT | Performed by: INTERNAL MEDICINE

## 2019-08-06 RX ORDER — LISINOPRIL 20 MG/1
20 TABLET ORAL DAILY
Qty: 90 TABLET | Refills: 3 | Status: SHIPPED | OUTPATIENT
Start: 2019-08-06 | End: 2020-04-17 | Stop reason: SDUPTHER

## 2019-08-06 RX ORDER — AMLODIPINE BESYLATE 5 MG/1
5 TABLET ORAL DAILY
Qty: 30 TABLET | Refills: 5 | Status: SHIPPED | OUTPATIENT
Start: 2019-08-06 | End: 2020-02-17

## 2019-08-06 RX ORDER — CITALOPRAM 10 MG/1
10 TABLET ORAL DAILY
Qty: 30 TABLET | Refills: 5 | Status: SHIPPED | OUTPATIENT
Start: 2019-08-06 | End: 2020-01-23 | Stop reason: SDUPTHER

## 2019-08-06 NOTE — PATIENT INSTRUCTIONS
Continue lisinopril and amlodipine for blood pressure  Stop zetia ( exetimibe) , no need for this anymore  Trial of citalopram, low dose at bedtime, for anxiety and stress  Prescriptions sent to pharmacy today    At your visit today a new medication was started  Please fill the prescription as soon as able and start taking the new medication as discussed  If you have side effects which are significant, call our office for instructions or stop taking the medication until discussing with the doctor or nurse  This medication is intended to help address your symptoms or improve a medical condition  You should always allow a little time for complete effectiveness  If you have been asked to follow up with a visit in the near future, please schedule this soon

## 2019-08-06 NOTE — PROGRESS NOTES
Subjective:   Chief Complaint   Patient presents with    Follow-up     4 mo         Patient ID: Janis French is a 80 y o  female  Very elderly  Confused and anxious  C/o anxiety over bank statements and bills  Shaky and off balance  Using a cane  Lives with nephew  Usually comes alone to office visits  The following portions of the patient's history were reviewed and updated as appropriate: allergies, current medications, past family history, past medical history, past social history, past surgical history and problem list     Review of Systems   Constitutional: Negative for fatigue and fever  HENT: Negative for hearing loss  Eyes: Negative for visual disturbance  Respiratory: Negative for cough, chest tightness, shortness of breath and wheezing  Cardiovascular: Negative for chest pain, palpitations and leg swelling  Gastrointestinal: Negative for abdominal pain, diarrhea and nausea  Genitourinary: Negative for dysuria and hematuria  Musculoskeletal: Negative for arthralgias  Neurological: Positive for weakness and numbness  Negative for dizziness and headaches  Psychiatric/Behavioral: Positive for confusion and dysphoric mood  The patient is nervous/anxious  All other systems reviewed and are negative          Current Outpatient Medications on File Prior to Visit   Medication Sig Dispense Refill    acetaminophen (TYLENOL) 325 mg tablet Take 650 mg by mouth every 6 (six) hours as needed for mild pain      aspirin 81 MG tablet Take 1 tablet by mouth daily      bimatoprost (LUMIGAN) 0 01 % ophthalmic drops Administer 1 drop to both eyes daily at bedtime      docusate sodium (COLACE) 100 mg capsule Take 1 capsule (100 mg total) by mouth 2 (two) times a day 10 capsule 0    dorzolamide-timolol (COSOPT) 22 3-6 8 MG/ML ophthalmic solution Apply to eye Twice daily      [DISCONTINUED] amLODIPine (NORVASC) 5 mg tablet Take 1 tablet (5 mg total) by mouth daily 30 tablet 5  [DISCONTINUED] lisinopril (ZESTRIL) 20 mg tablet Take 1 tablet (20 mg total) by mouth daily 90 tablet 3     No current facility-administered medications on file prior to visit  Objective:  Vitals:    08/06/19 1055   BP: 122/60   Pulse: 80   SpO2: 98%   Weight: 51 9 kg (114 lb 6 4 oz)   Height: 5' 7" (1 702 m)      Physical Exam   Constitutional: She is oriented to person, place, and time  She appears well-developed and well-nourished  Eyes: Pupils are equal, round, and reactive to light  Neck: Normal range of motion  Neck supple  No thyromegaly present  Cardiovascular: Normal rate, regular rhythm, normal heart sounds and intact distal pulses  No murmur heard  Pulmonary/Chest: Effort normal and breath sounds normal  She has no wheezes  She has no rales  Abdominal: Soft  Bowel sounds are normal  There is no tenderness  Musculoskeletal: Normal range of motion  She exhibits no edema, tenderness or deformity  Lymphadenopathy:     She has no cervical adenopathy  Neurological: She is alert and oriented to person, place, and time  She has normal reflexes  Skin: Skin is warm and dry  Psychiatric: She has a normal mood and affect  Nursing note and vitals reviewed  Assessment/Plan:    Patient has significant anxiety  Dementia slowly progressing  Will add citalopram for anxiety, depression  Not sure if she will be compliant with taking this new medication  Concerns about social support  Lives with nephews who seem not to be tuned in to her advancing frailty  No problem-specific Assessment & Plan notes found for this encounter  Diagnoses and all orders for this visit:    Non-ST elevation myocardial infarction (NSTEMI) ([facility] [address] )    Essential hypertension  -     lisinopril (ZESTRIL) 20 mg tablet; Take 1 tablet (20 mg total) by mouth daily  -     amLODIPine (NORVASC) 5 mg tablet;  Take 1 tablet (5 mg total) by mouth daily    Vascular dementia with behavior disturbance  - citalopram (CeleXA) 10 mg tablet; Take 1 tablet (10 mg total) by mouth daily    Advanced age    Pre-syncope  -     amLODIPine (NORVASC) 5 mg tablet;  Take 1 tablet (5 mg total) by mouth daily

## 2019-09-05 ENCOUNTER — OFFICE VISIT (OUTPATIENT)
Dept: FAMILY MEDICINE CLINIC | Facility: HOSPITAL | Age: 84
End: 2019-09-05
Payer: MEDICARE

## 2019-09-05 VITALS
OXYGEN SATURATION: 99 % | DIASTOLIC BLOOD PRESSURE: 62 MMHG | HEIGHT: 67 IN | BODY MASS INDEX: 17.89 KG/M2 | SYSTOLIC BLOOD PRESSURE: 124 MMHG | WEIGHT: 114 LBS | HEART RATE: 75 BPM

## 2019-09-05 DIAGNOSIS — I21.4 NON-ST ELEVATION MYOCARDIAL INFARCTION (NSTEMI) (HCC): ICD-10-CM

## 2019-09-05 DIAGNOSIS — I48.0 PAROXYSMAL ATRIAL FIBRILLATION (HCC): ICD-10-CM

## 2019-09-05 DIAGNOSIS — F01.51 VASCULAR DEMENTIA WITH BEHAVIOR DISTURBANCE (HCC): Primary | ICD-10-CM

## 2019-09-05 PROCEDURE — 99213 OFFICE O/P EST LOW 20 MIN: CPT | Performed by: INTERNAL MEDICINE

## 2019-09-05 NOTE — PROGRESS NOTES
Subjective:   Chief Complaint   Patient presents with    Follow-up     1 month - PT is c/o being shaky - feels nervous in the morning        Patient ID: Danielle Ortega is a 80 y o  female  Follow up after adding citalopram for anxiety, dementia, some paranoia  Not sure of compliance as she is poor historian  Very anxious and nervous,  Did nto start the new med due to fear about side effects on pharmacy pamphlet  Discussed again and urged to try new med  The following portions of the patient's history were reviewed and updated as appropriate: allergies, current medications, past family history, past medical history, past social history, past surgical history and problem list     Review of Systems   Constitutional: Negative for fatigue and fever  HENT: Negative for hearing loss  Eyes: Negative for visual disturbance  Respiratory: Negative for cough, chest tightness, shortness of breath and wheezing  Cardiovascular: Negative for chest pain, palpitations and leg swelling  Gastrointestinal: Negative for abdominal pain, diarrhea and nausea  Genitourinary: Negative for dysuria and hematuria  Musculoskeletal: Negative for arthralgias  Neurological: Negative for dizziness, numbness and headaches  Psychiatric/Behavioral: Negative for confusion and dysphoric mood  All other systems reviewed and are negative          Current Outpatient Medications on File Prior to Visit   Medication Sig Dispense Refill    acetaminophen (TYLENOL) 325 mg tablet Take 650 mg by mouth every 6 (six) hours as needed for mild pain      amLODIPine (NORVASC) 5 mg tablet Take 1 tablet (5 mg total) by mouth daily 30 tablet 5    aspirin 81 MG tablet Take 1 tablet by mouth daily      bimatoprost (LUMIGAN) 0 01 % ophthalmic drops Administer 1 drop to both eyes daily at bedtime      citalopram (CeleXA) 10 mg tablet Take 1 tablet (10 mg total) by mouth daily 30 tablet 5    docusate sodium (COLACE) 100 mg capsule Take 1 capsule (100 mg total) by mouth 2 (two) times a day 10 capsule 0    dorzolamide-timolol (COSOPT) 22 3-6 8 MG/ML ophthalmic solution Apply to eye Twice daily      lisinopril (ZESTRIL) 20 mg tablet Take 1 tablet (20 mg total) by mouth daily 90 tablet 3     No current facility-administered medications on file prior to visit  Objective:  Vitals:    09/05/19 1058   BP: 124/62   Pulse: 75   SpO2: 99%   Weight: 51 7 kg (114 lb)   Height: 5' 7" (1 702 m)      Physical Exam   Constitutional: She is oriented to person, place, and time  She appears well-developed and well-nourished  Eyes: Pupils are equal, round, and reactive to light  Neck: Normal range of motion  Neck supple  No thyromegaly present  Cardiovascular: Normal rate, regular rhythm, normal heart sounds and intact distal pulses  No murmur heard  Pulmonary/Chest: Effort normal and breath sounds normal  She has no wheezes  She has no rales  Abdominal: Soft  Bowel sounds are normal  There is no tenderness  Musculoskeletal: Normal range of motion  She exhibits no edema, tenderness or deformity  Lymphadenopathy:     She has no cervical adenopathy  Neurological: She is alert and oriented to person, place, and time  She has normal reflexes  Skin: Skin is warm and dry  Psychiatric: She has a normal mood and affect  Nursing note and vitals reviewed  Assessment/Plan:    Paroxysmal atrial fibrillation (HCC)  Has been in sinus, no anticoagulation due to fall risk    Non-ST elevation myocardial infarction (NSTEMI) ([facility] [address] )  No significant CAD  monitor    Vascular dementia with behavior disturbance  After discussion with nephew who is caregiver, started citalopram   Here for re assessment          Diagnoses and all orders for this visit:    Vascular dementia with behavior disturbance    Paroxysmal atrial fibrillation (HCC)    Non-ST elevation myocardial infarction (NSTEMI) ([facility] [address] )

## 2019-09-05 NOTE — PATIENT INSTRUCTIONS
You were seen for a follow up of chronic medical problems today  Be sure to make any changes to your medication as discussed by your doctor  If blood tests or other testing was ordered, be sure to obtain this at the time requested by the doctor  Our office will call you with the results of your tests once they arrive in our office  REcommend you try the new medication for at least one week and see if it helps  Do not be afraid of medictions side effects, please discuss with me if the medication causes problems  The benefit is higher than the possible adverse effects

## 2019-10-17 ENCOUNTER — OFFICE VISIT (OUTPATIENT)
Dept: FAMILY MEDICINE CLINIC | Facility: HOSPITAL | Age: 84
End: 2019-10-17
Payer: MEDICARE

## 2019-10-17 VITALS
BODY MASS INDEX: 18.96 KG/M2 | SYSTOLIC BLOOD PRESSURE: 128 MMHG | WEIGHT: 118 LBS | DIASTOLIC BLOOD PRESSURE: 68 MMHG | HEART RATE: 80 BPM | HEIGHT: 66 IN

## 2019-10-17 DIAGNOSIS — F01.51 VASCULAR DEMENTIA WITH BEHAVIOR DISTURBANCE (HCC): ICD-10-CM

## 2019-10-17 DIAGNOSIS — I10 ESSENTIAL HYPERTENSION: Chronic | ICD-10-CM

## 2019-10-17 DIAGNOSIS — Z23 NEED FOR INFLUENZA VACCINATION: Primary | ICD-10-CM

## 2019-10-17 PROCEDURE — G0008 ADMIN INFLUENZA VIRUS VAC: HCPCS

## 2019-10-17 PROCEDURE — 90662 IIV NO PRSV INCREASED AG IM: CPT

## 2019-10-17 PROCEDURE — 99213 OFFICE O/P EST LOW 20 MIN: CPT | Performed by: INTERNAL MEDICINE

## 2019-10-17 NOTE — PROGRESS NOTES
Subjective:   Chief Complaint   Patient presents with    Follow-up     flu vaccine today        Patient ID: Rosi Quintero is a 80 y o  female  Recently started on celexa for depression and anxiety  Affect is much improved  She is smiling and seems content  Making a joke about raking all the leaves  Significant change from past 2 visits  Encouraged to continue new regimen  The following portions of the patient's history were reviewed and updated as appropriate: allergies, current medications, past family history, past medical history, past social history, past surgical history and problem list     Review of Systems   Constitutional: Positive for fatigue  Eyes: Positive for photophobia and redness  Cardiovascular: Positive for leg swelling  Neurological: Positive for weakness  Psychiatric/Behavioral: Positive for agitation, confusion and dysphoric mood  All other systems reviewed and are negative  Current Outpatient Medications on File Prior to Visit   Medication Sig Dispense Refill    acetaminophen (TYLENOL) 325 mg tablet Take 650 mg by mouth every 6 (six) hours as needed for mild pain      amLODIPine (NORVASC) 5 mg tablet Take 1 tablet (5 mg total) by mouth daily 30 tablet 5    aspirin 81 MG tablet Take 1 tablet by mouth daily      bimatoprost (LUMIGAN) 0 01 % ophthalmic drops Administer 1 drop to both eyes daily at bedtime      citalopram (CeleXA) 10 mg tablet Take 1 tablet (10 mg total) by mouth daily 30 tablet 5    dorzolamide-timolol (COSOPT) 22 3-6 8 MG/ML ophthalmic solution Apply to eye Twice daily      lisinopril (ZESTRIL) 20 mg tablet Take 1 tablet (20 mg total) by mouth daily 90 tablet 3    docusate sodium (COLACE) 100 mg capsule Take 1 capsule (100 mg total) by mouth 2 (two) times a day 10 capsule 0     No current facility-administered medications on file prior to visit          Objective:  Vitals:    10/17/19 1100   BP: 128/68   Pulse: 80   Weight: 53 5 kg (118 lb)   Height: 5' 6" (1 676 m)      Physical Exam   Constitutional: She is oriented to person, place, and time  She appears well-developed and well-nourished  Eyes: Pupils are equal, round, and reactive to light  Neck: Normal range of motion  Neck supple  No thyromegaly present  Cardiovascular: Normal rate, regular rhythm, normal heart sounds and intact distal pulses  No murmur heard  Pulmonary/Chest: Effort normal and breath sounds normal  She has no wheezes  She has no rales  Abdominal: Soft  Bowel sounds are normal  There is no tenderness  Musculoskeletal: Normal range of motion  She exhibits no edema, tenderness or deformity  Lymphadenopathy:     She has no cervical adenopathy  Neurological: She is alert and oriented to person, place, and time  She has normal reflexes  Skin: Skin is warm and dry  Psychiatric: She has a normal mood and affect  Nursing note and vitals reviewed  Assessment/Plan:    No problem-specific Assessment & Plan notes found for this encounter         Diagnoses and all orders for this visit:    Need for influenza vaccination  -     influenza vaccine, [id number], high-dose, PF 0 5 mL (FLUZONE HIGH-DOSE)    Essential hypertension    Vascular dementia with behavior disturbance ([facility] [address] )

## 2020-01-23 ENCOUNTER — OFFICE VISIT (OUTPATIENT)
Dept: FAMILY MEDICINE CLINIC | Facility: HOSPITAL | Age: 85
End: 2020-01-23
Payer: MEDICARE

## 2020-01-23 VITALS
HEIGHT: 66 IN | HEART RATE: 79 BPM | OXYGEN SATURATION: 96 % | WEIGHT: 120 LBS | DIASTOLIC BLOOD PRESSURE: 76 MMHG | BODY MASS INDEX: 19.29 KG/M2 | SYSTOLIC BLOOD PRESSURE: 152 MMHG

## 2020-01-23 DIAGNOSIS — F01.51 VASCULAR DEMENTIA WITH BEHAVIOR DISTURBANCE (HCC): ICD-10-CM

## 2020-01-23 DIAGNOSIS — I21.4 NON-ST ELEVATION MYOCARDIAL INFARCTION (NSTEMI) (HCC): ICD-10-CM

## 2020-01-23 DIAGNOSIS — I48.0 PAROXYSMAL ATRIAL FIBRILLATION (HCC): Primary | ICD-10-CM

## 2020-01-23 DIAGNOSIS — I10 ESSENTIAL HYPERTENSION: Chronic | ICD-10-CM

## 2020-01-23 PROCEDURE — 99214 OFFICE O/P EST MOD 30 MIN: CPT | Performed by: INTERNAL MEDICINE

## 2020-01-23 RX ORDER — CITALOPRAM 10 MG/1
10 TABLET ORAL DAILY
Qty: 30 TABLET | Refills: 5 | Status: SHIPPED | OUTPATIENT
Start: 2020-01-23 | End: 2020-07-30

## 2020-01-23 NOTE — PROGRESS NOTES
Subjective:   Chief Complaint   Patient presents with    Follow-up     3 mo        Patient ID: Constantine Christensen is a 80 y o  female  Here for routine follow up  Very advance age  Very hard of hearing  Lives with nephew and presents for visit alone  Had a fall at home  Lost balance while in basement with laundry  Hit head of water heater  Lots of bleeding and a lump  Nephew attended her but did nto bring in for eval at the time  Patient states she had a headache, now improved  The following portions of the patient's history were reviewed and updated as appropriate: allergies, current medications, past family history, past medical history, past social history, past surgical history and problem list     Review of Systems   Constitutional: Positive for fatigue  HENT: Positive for hearing loss  Respiratory: Positive for shortness of breath  Negative for cough, choking and chest tightness  Cardiovascular: Negative for chest pain, palpitations and leg swelling  Genitourinary: Positive for frequency and urgency  Neurological: Positive for weakness  Psychiatric/Behavioral: Positive for confusion  The patient is nervous/anxious  Falls Plan of Care: balance, strength, and gait training instructions were provided  Recommended assistive device to help with gait and balance  Assessed feet and footwear  Patient is at risk  Lives with nephew who does not acknowledge that she is age very advanced and needs assistive care  [place] in basement with laundry  Declines home eval or area agency on aging input           Current Outpatient Medications on File Prior to Visit   Medication Sig Dispense Refill    acetaminophen (TYLENOL) 325 mg tablet Take 650 mg by mouth every 6 (six) hours as needed for mild pain      amLODIPine (NORVASC) 5 mg tablet Take 1 tablet (5 mg total) by mouth daily 30 tablet 5    aspirin 81 MG tablet Take 1 tablet by mouth daily      bimatoprost (LUMIGAN) 0 01 % ophthalmic drops Administer 1 drop to both eyes daily at bedtime      citalopram (CeleXA) 10 mg tablet Take 1 tablet (10 mg total) by mouth daily 30 tablet 5    docusate sodium (COLACE) 100 mg capsule Take 1 capsule (100 mg total) by mouth 2 (two) times a day 10 capsule 0    dorzolamide-timolol (COSOPT) 22 3-6 8 MG/ML ophthalmic solution Apply to eye Twice daily      lisinopril (ZESTRIL) 20 mg tablet Take 1 tablet (20 mg total) by mouth daily 90 tablet 3     No current facility-administered medications on file prior to visit  Objective:  Vitals:    01/23/20 1048   BP: 152/76   Pulse: 79   SpO2: 96%   Weight: 54 4 kg (120 lb)   Height: 5' 6" (1 676 m)      Physical Exam   Constitutional: She is oriented to person, place, and time  She appears well-developed and well-nourished  Eyes: Pupils are equal, round, and reactive to light  Neck: Normal range of motion  Neck supple  No thyromegaly present  Cardiovascular: Normal rate, regular rhythm, normal heart sounds and intact distal pulses  No murmur heard  Pulmonary/Chest: Effort normal and breath sounds normal  She has no wheezes  She has no rales  Abdominal: Soft  Bowel sounds are normal  There is no tenderness  Musculoskeletal: Normal range of motion  She exhibits no edema, tenderness or deformity  Lymphadenopathy:     She has no cervical adenopathy  Neurological: She is alert and oriented to person, place, and time  She has normal reflexes  Skin: Skin is warm and dry  Psychiatric: She has a normal mood and affect  Nursing note and vitals reviewed  Assessment/Plan:    No problem-specific Assessment & Plan notes found for this encounter         Diagnoses and all orders for this visit:    Paroxysmal atrial fibrillation (HCC)    Non-ST elevation myocardial infarction (NSTEMI) [facility])    Essential hypertension    Vascular dementia with behavior disturbance ([facility] [address] )

## 2020-01-23 NOTE — PATIENT INSTRUCTIONS
I have concerns about falling  Would strongly recommend use of a walker  If desired could refer to physical therapy for balance training  You were seen for a follow up of chronic medical problems today  Be sure to make any changes to your medication as discussed by your doctor  If blood tests or other testing was ordered, be sure to obtain this at the time requested by the doctor  Our office will call you with the results of your tests once they arrive in our office

## 2020-02-16 DIAGNOSIS — I10 ESSENTIAL HYPERTENSION: Chronic | ICD-10-CM

## 2020-02-16 DIAGNOSIS — R55 PRE-SYNCOPE: ICD-10-CM

## 2020-02-17 RX ORDER — AMLODIPINE BESYLATE 5 MG/1
TABLET ORAL
Qty: 30 TABLET | Refills: 5 | Status: SHIPPED | OUTPATIENT
Start: 2020-02-17 | End: 2020-07-30 | Stop reason: SDUPTHER

## 2020-04-17 DIAGNOSIS — I10 ESSENTIAL HYPERTENSION: Chronic | ICD-10-CM

## 2020-04-20 RX ORDER — LISINOPRIL 20 MG/1
20 TABLET ORAL DAILY
Qty: 90 TABLET | Refills: 3 | Status: SHIPPED | OUTPATIENT
Start: 2020-04-20 | End: 2021-01-01 | Stop reason: SDUPTHER

## 2020-07-09 ENCOUNTER — TELEPHONE (OUTPATIENT)
Dept: FAMILY MEDICINE CLINIC | Facility: HOSPITAL | Age: 85
End: 2020-07-09

## 2020-07-10 DIAGNOSIS — L30.9 DERMATITIS: Primary | ICD-10-CM

## 2020-07-10 RX ORDER — NYSTATIN AND TRIAMCINOLONE ACETONIDE 100000; 1 [USP'U]/G; MG/G
OINTMENT TOPICAL 2 TIMES DAILY
Qty: 30 G | Refills: 0 | Status: SHIPPED | OUTPATIENT
Start: 2020-07-10

## 2020-07-30 ENCOUNTER — OFFICE VISIT (OUTPATIENT)
Dept: FAMILY MEDICINE CLINIC | Facility: HOSPITAL | Age: 85
End: 2020-07-30
Payer: MEDICARE

## 2020-07-30 VITALS
SYSTOLIC BLOOD PRESSURE: 138 MMHG | WEIGHT: 115 LBS | HEIGHT: 66 IN | DIASTOLIC BLOOD PRESSURE: 64 MMHG | HEART RATE: 81 BPM | TEMPERATURE: 99 F | OXYGEN SATURATION: 95 % | BODY MASS INDEX: 18.48 KG/M2

## 2020-07-30 DIAGNOSIS — I10 ESSENTIAL HYPERTENSION: Chronic | ICD-10-CM

## 2020-07-30 DIAGNOSIS — F01.51 VASCULAR DEMENTIA WITH BEHAVIOR DISTURBANCE (HCC): ICD-10-CM

## 2020-07-30 DIAGNOSIS — R55 PRE-SYNCOPE: ICD-10-CM

## 2020-07-30 PROCEDURE — 1160F RVW MEDS BY RX/DR IN RCRD: CPT | Performed by: INTERNAL MEDICINE

## 2020-07-30 PROCEDURE — 3075F SYST BP GE 130 - 139MM HG: CPT | Performed by: INTERNAL MEDICINE

## 2020-07-30 PROCEDURE — 4040F PNEUMOC VAC/ADMIN/RCVD: CPT | Performed by: INTERNAL MEDICINE

## 2020-07-30 PROCEDURE — 1170F FXNL STATUS ASSESSED: CPT | Performed by: INTERNAL MEDICINE

## 2020-07-30 PROCEDURE — 99214 OFFICE O/P EST MOD 30 MIN: CPT | Performed by: INTERNAL MEDICINE

## 2020-07-30 PROCEDURE — 3008F BODY MASS INDEX DOCD: CPT | Performed by: INTERNAL MEDICINE

## 2020-07-30 PROCEDURE — 3078F DIAST BP <80 MM HG: CPT | Performed by: INTERNAL MEDICINE

## 2020-07-30 PROCEDURE — 1036F TOBACCO NON-USER: CPT | Performed by: INTERNAL MEDICINE

## 2020-07-30 PROCEDURE — G0439 PPPS, SUBSEQ VISIT: HCPCS | Performed by: INTERNAL MEDICINE

## 2020-07-30 PROCEDURE — 1123F ACP DISCUSS/DSCN MKR DOCD: CPT | Performed by: INTERNAL MEDICINE

## 2020-07-30 RX ORDER — CITALOPRAM 20 MG/1
20 TABLET ORAL DAILY
Qty: 30 TABLET | Refills: 5 | Status: SHIPPED | OUTPATIENT
Start: 2020-07-30 | End: 2020-07-30

## 2020-07-30 RX ORDER — CITALOPRAM 20 MG/1
TABLET ORAL
Qty: 90 TABLET | Refills: 5 | Status: SHIPPED | OUTPATIENT
Start: 2020-07-30 | End: 2021-01-01 | Stop reason: SDUPTHER

## 2020-07-30 RX ORDER — AMLODIPINE BESYLATE 5 MG/1
5 TABLET ORAL DAILY
Qty: 30 TABLET | Refills: 5 | Status: SHIPPED | OUTPATIENT
Start: 2020-07-30 | End: 2020-07-30

## 2020-07-30 RX ORDER — AMLODIPINE BESYLATE 5 MG/1
TABLET ORAL
Qty: 90 TABLET | Refills: 5 | Status: SHIPPED | OUTPATIENT
Start: 2020-07-30 | End: 2021-01-01 | Stop reason: SDUPTHER

## 2020-07-30 NOTE — PATIENT INSTRUCTIONS
Medicare Preventive Visit Patient Instructions  Thank you for completing your Welcome to Medicare Visit or Medicare Annual Wellness Visit today  Your next wellness visit will be due in one year (7/30/2021)  The screening/preventive services that you may require over the next 5-10 years are detailed below  Some tests may not apply to you based off risk factors and/or age  Screening tests ordered at today's visit but not completed yet may show as past due  Also, please note that scanned in results may not display below  Preventive Screenings:  Service Recommendations Previous Testing/Comments   Colorectal Cancer Screening  * Colonoscopy    * Fecal Occult Blood Test (FOBT)/Fecal Immunochemical Test (FIT)  * Fecal DNA/Cologuard Test  * Flexible Sigmoidoscopy Age: 54-65 years old   Colonoscopy: every 10 years (may be performed more frequently if at higher risk)  OR  FOBT/FIT: every 1 year  OR  Cologuard: every 3 years  OR  Sigmoidoscopy: every 5 years  Screening may be recommended earlier than age 48 if at higher risk for colorectal cancer  Also, an individualized decision between you and your healthcare provider will decide whether screening between the ages of 74-80 would be appropriate  Colonoscopy: Not on file  FOBT/FIT: Not on file  Cologuard: Not on file  Sigmoidoscopy: Not on file    Screening Not Indicated     Breast Cancer Screening Age: 36 years old  Frequency: every 1-2 years  Not required if history of left and right mastectomy Mammogram: Not on file       Cervical Cancer Screening Between the ages of 21-29, pap smear recommended once every 3 years  Between the ages of 33-67, can perform pap smear with HPV co-testing every 5 years     Recommendations may differ for women with a history of total hysterectomy, cervical cancer, or abnormal pap smears in past  Pap Smear: Not on file    Screening Not Indicated   Hepatitis C Screening Once for adults born between 1945 and 1965  More frequently in patients at high risk for Hepatitis C Hep C Antibody: Not on file       Diabetes Screening 1-2 times per year if you're at risk for diabetes or have pre-diabetes Fasting glucose: 95 mg/dL   A1C: No results in last 5 years       Cholesterol Screening Once every 5 years if you don't have a lipid disorder  May order more often based on risk factors  Lipid panel: 09/12/2018    Screening Current     Other Preventive Screenings Covered by Medicare:  1  Abdominal Aortic Aneurysm (AAA) Screening: covered once if your at risk  You're considered to be at risk if you have a family history of AAA  2  Lung Cancer Screening: covers low dose CT scan once per year if you meet all of the following conditions: (1) Age 50-69; (2) No signs or symptoms of lung cancer; (3) Current smoker or have quit smoking within the last 15 years; (4) You have a tobacco smoking history of at least 30 pack years (packs per day multiplied by number of years you smoked); (5) You get a written order from a healthcare provider  3  Glaucoma Screening: covered annually if you're considered high risk: (1) You have diabetes OR (2) Family history of glaucoma OR (3)  aged 48 and older OR (3)  American aged 72 and older  3  Osteoporosis Screening: covered every 2 years if you meet one of the following conditions: (1) You're estrogen deficient and at risk for osteoporosis based off medical history and other findings; (2) Have a vertebral abnormality; (3) On glucocorticoid therapy for more than 3 months; (4) Have primary hyperparathyroidism; (5) On osteoporosis medications and need to assess response to drug therapy  · Last bone density test (DXA Scan): 09/11/2006  5  HIV Screening: covered annually if you're between the age of 12-76  Also covered annually if you are younger than 13 and older than 72 with risk factors for HIV infection  For pregnant patients, it is covered up to 3 times per pregnancy      Immunizations:  Immunization Recommendations Influenza Vaccine Annual influenza vaccination during flu season is recommended for all persons aged >= 6 months who do not have contraindications   Pneumococcal Vaccine (Prevnar and Pneumovax)  * Prevnar = PCV13  * Pneumovax = PPSV23   Adults 25-60 years old: 1-3 doses may be recommended based on certain risk factors  Adults 72 years old: Prevnar (PCV13) vaccine recommended followed by Pneumovax (PPSV23) vaccine  If already received PPSV23 since turning 65, then PCV13 recommended at least one year after PPSV23 dose  Hepatitis B Vaccine 3 dose series if at intermediate or high risk (ex: diabetes, end stage renal disease, liver disease)   Tetanus (Td) Vaccine - COST NOT COVERED BY MEDICARE PART B Following completion of primary series, a booster dose should be given every 10 years to maintain immunity against tetanus  Td may also be given as tetanus wound prophylaxis  Tdap Vaccine - COST NOT COVERED BY MEDICARE PART B Recommended at least once for all adults  For pregnant patients, recommended with each pregnancy  Shingles Vaccine (Shingrix) - COST NOT COVERED BY MEDICARE PART B  2 shot series recommended in those aged 48 and above     Health Maintenance Due:  There are no preventive care reminders to display for this patient  Immunizations Due:      Topic Date Due    Influenza Vaccine  07/01/2020     Advance Directives   What are advance directives? Advance directives are legal documents that state your wishes and plans for medical care  These plans are made ahead of time in case you lose your ability to make decisions for yourself  Advance directives can apply to any medical decision, such as the treatments you want, and if you want to donate organs  What are the types of advance directives? There are many types of advance directives, and each state has rules about how to use them  You may choose a combination of any of the following:  · Living will:   This is a written record of the treatment you want  You can also choose which treatments you do not want, which to limit, and which to stop at a certain time  This includes surgery, medicine, IV fluid, and tube feedings  · Durable power of  for healthcare [place]): This is a written record that states who you want to make healthcare choices for you when you are unable to make them for yourself  This person, called a proxy, is usually a family member or a friend  You may choose more than 1 proxy  · Do not resuscitate (DNR) order:  A DNR order is used in case your heart stops beating or you stop breathing  It is a request not to have certain forms of treatment, such as CPR  A DNR order may be included in other types of advance directives  · Medical directive: This covers the care that you want if you are in a coma, near death, or unable to make decisions for yourself  You can list the treatments you want for each condition  Treatment may include pain medicine, surgery, blood transfusions, dialysis, IV or tube feedings, and a ventilator (breathing machine)  · Values history: This document has questions about your views, beliefs, and how you feel and think about life  This information can help others choose the care that you would choose  Why are advance directives important? An advance directive helps you control your care  Although spoken wishes may be used, it is better to have your wishes written down  Spoken wishes can be misunderstood, or not followed  Treatments may be given even if you do not want them  An advance directive may make it easier for your family to make difficult choices about your care  Urinary Incontinence   Urinary incontinence (UI)  is when you lose control of your bladder  UI develops because your bladder cannot store or empty urine properly  The 3 most common types of UI are stress incontinence, urge incontinence, or both  Medicines:   · May be given to help strengthen your bladder control   Report any side effects of medication to your healthcare provider  Do pelvic muscle exercises often:  Your pelvic muscles help you stop urinating  Squeeze these muscles tight for 5 seconds, then relax for 5 seconds  Gradually work up to squeezing for 10 seconds  Do 3 sets of 15 repetitions a day, or as directed  This will help strengthen your pelvic muscles and improve bladder control  Train your bladder:  Go to the bathroom at set times, such as every 2 hours, even if you do not feel the urge to go  You can also try to hold your urine when you feel the urge to go  For example, hold your urine for 5 minutes when you feel the urge to go  As that becomes easier, hold your urine for 10 minutes  Self-care:   · Keep a UI record  Write down how often you leak urine and how much you leak  Make a note of what you were doing when you leaked urine  · Drink liquids as directed  You may need to limit the amount of liquid you drink to help control your urine leakage  Do not drink any liquid right before you go to bed  Limit or do not have drinks that contain caffeine or alcohol  · Prevent constipation  Eat a variety of high-fiber foods  Good examples are high-fiber cereals, beans, vegetables, and whole-grain breads  Walking is the best way to trigger your intestines to have a bowel movement  · Exercise regularly and maintain a healthy weight  Weight loss and exercise will decrease pressure on your bladder and help you control your leakage  · Use a catheter as directed  to help empty your bladder  A catheter is a tiny, plastic tube that is put into your bladder to drain your urine  · Go to behavior therapy as directed  Behavior therapy may be used to help you learn to control your urge to urinate  © Copyright [employer] 2018 Information is for End User's use only and may not be sold, redistributed or otherwise used for commercial purposes   All illustrations and images included in CareNotes® are the copyrighted property of [employer]  or [facility]

## 2020-07-30 NOTE — PROGRESS NOTES
Assessment and Plan:     Problem List Items Addressed This Visit        Cardiovascular and Mediastinum    Essential hypertension (Chronic)      Other Visit Diagnoses     Pre-syncope               Preventive health issues were discussed with patient, and age appropriate screening tests were ordered as noted in patient's After Visit Summary  Personalized health advice and appropriate referrals for health education or preventive services given if needed, as noted in patient's After Visit Summary  History of Present Illness:     Patient presents for Welcome to Medicare visit  Patient Care Team:  Tomasz Ahmadi MD as PCP - MD Tomasz Saleh MD     Review of Systems:     Review of Systems   Problem List:     Patient Active Problem List   Diagnosis    Paroxysmal atrial fibrillation [facility])    Essential hypertension    Advanced age   Colleenbarbara Singleton Non-ST elevation myocardial infarction (NSTEMI) [facility])    Vascular dementia with behavior disturbance [facility])      Past Medical and Surgical History:     Past Medical History:   Diagnosis Date    A-fib [facility])     last assessed 10/16/17    CHF [facility]congestive heart failure) ([facility] [address] )     History of varicose veins     Hypertension      History reviewed  No pertinent surgical history  Family History:     Family History   Problem Relation Age of Onset   Colleen Singleton Cancer Mother     Hypertension Mother     Hypertension Father     Diabetes Sister     Colon cancer Family     Stroke Family         ischemic       Social History:     E-Cigarette/Vaping    E-Cigarette Use Never User      E-Cigarette/Vaping Substances    Nicotine No     THC No     CBD No     Flavoring No     Other No     Unknown No      Social History     Socioeconomic History    Marital status:       Spouse name: None    Number of children: None    Years of education: None    Highest education level: None   Occupational History    Occupation: retired   Social Needs    Financial resource strain: None    Food insecurity:     Worry: None     Inability: None    Transportation needs:     Medical: None     Non-medical: None   Tobacco Use    Smoking status: Never Smoker    Smokeless tobacco: Never Used    Tobacco comment: non smoker    Substance and Sexual Activity    Alcohol use: No    Drug use: No    Sexual activity: Never   Lifestyle    Physical activity:     Days per week: 0 days     Minutes per session: 0 min    Stress: None   Relationships    Social connections:     Talks on phone: None     Gets together: None     Attends Druze service: None     Active member of club or organization: None     Attends meetings of clubs or organizations: None     Relationship status: None    Intimate partner violence:     Fear of current or ex partner: None     Emotionally abused: None     Physically abused: None     Forced sexual activity: None   Other Topics Concern    None   Social History Narrative    Exercise, walking     Wears seatbelt      Medications and Allergies:     Current Outpatient Medications   Medication Sig Dispense Refill    acetaminophen (TYLENOL) 325 mg tablet Take 650 mg by mouth every 6 (six) hours as needed for mild pain      amLODIPine (NORVASC) 5 mg tablet take 1 tablet by mouth once daily 30 tablet 5    aspirin 81 MG tablet Take 1 tablet by mouth daily      bimatoprost (LUMIGAN) 0 01 % ophthalmic drops Administer 1 drop to both eyes daily at bedtime      citalopram (CeleXA) 10 mg tablet Take 1 tablet (10 mg total) by mouth daily 30 tablet 5    docusate sodium (COLACE) 100 mg capsule Take 1 capsule (100 mg total) by mouth 2 (two) times a day 10 capsule 0    dorzolamide-timolol (COSOPT) 22 3-6 8 MG/ML ophthalmic solution Apply to eye Twice daily      lisinopril (ZESTRIL) 20 mg tablet Take 1 tablet (20 mg total) by mouth daily 90 tablet 3    nystatin-triamcinolone (MYCOLOG-II) ointment Apply topically 2 (two) times a day 30 g 0     No current facility-administered medications for this visit  Allergies   Allergen Reactions    Penicillins     Pollen Extract     Sulfa Antibiotics       Immunizations:     Immunization History   Administered Date(s) Administered    INFLUENZA 10/19/2015, 10/06/2016, 10/16/2017    Influenza Split High Dose Preservative Free IM 11/24/2014, 10/19/2015, 10/06/2016, 10/16/2017    Influenza TIV (IM) 10/21/2010, 09/20/2011, 10/05/2012, 11/09/2013    Influenza, high dose seasonal 0 5 mL 10/15/2018, 10/17/2019    Pneumococcal Conjugate 13-Valent 10/16/2017    Pneumococcal Polysaccharide PPV23 06/15/2011      Health Maintenance: There are no preventive care reminders to display for this patient  Topic Date Due    Influenza Vaccine  07/01/2020      Medicare Screening Tests and Risk Assessments:     Pedro Bell is here for her Subsequent Wellness visit  Health Risk Assessment:   Patient rates overall health as fair  Patient feels that their physical health rating is slightly worse  Eyesight was rated as same  Hearing was rated as much worse  Patient feels that their emotional and mental health rating is slightly worse  Depression Screening:   PHQ-2 Score: 1      Fall Risk Screening: In the past year, patient has experienced: no history of falling in past year      Urinary Incontinence Screening:   Patient has leaked urine accidently in the last six months  Home Safety:  Patient has trouble with stairs inside or outside of their home  Patient has working smoke alarms and has working carbon monoxide detector  Home safety hazards include: household clutter  Nutrition:   Current diet is Regular  Medications:   Patient is not currently taking any over-the-counter supplements  Patient is able to manage medications       Activities of Daily Living (ADLs)/Instrumental Activities of Daily Living (IADLs):   Walk and transfer into and out of bed and chair?: Yes  Dress and groom yourself?: Yes    Bathe or shower yourself?: No Feed yourself? Yes  Do your laundry/housekeeping?: No  Manage your money, pay your bills and track your expenses?: Yes  Make your own meals?: Yes    Do your own shopping?: No    Previous Hospitalizations:   Any hospitalizations or ED visits within the last 12 months?: No      Advance Care Planning:   Living will: No    Advanced directive: No    Advanced directive counseling given: Yes      Cognitive Screening:   Provider or family/friend/caregiver concerned regarding cognition?: Yes    Cognition Comments: Family notes worsening dementia  They are arranging for placement       PREVENTIVE SCREENINGS      Cardiovascular Screening:    General: Screening Current      Diabetes Screening:     General: Screening Current      Colorectal Cancer Screening:     General: Screening Not Indicated      Breast Cancer Screening:     General: Screening Not Indicated      Cervical Cancer Screening:    General: Screening Not Indicated      Osteoporosis Screening:    General: Patient Declines      Abdominal Aortic Aneurysm (AAA) Screening:        General: Patient Declines      Lung Cancer Screening:     General: Screening Not Indicated      Hepatitis C Screening:    General: Screening Not Indicated    No exam data present     Physical Exam:     /64   Pulse 81   Temp 99 °F (37 2 °C) (Tympanic)   Ht 5' 6" (1 676 m)   Wt 52 2 kg (115 lb)   LMP  (LMP Unknown)   SpO2 95%   BMI 18 56 kg/m²     Physical Exam    David Day MD

## 2020-07-30 NOTE — PROGRESS NOTES
Assessment/Plan:    No problem-specific Assessment & Plan notes found for this encounter  Diagnoses and all orders for this visit:    Essential hypertension  -     amLODIPine (NORVASC) 5 mg tablet; Take 1 tablet (5 mg total) by mouth daily    Pre-syncope  -     amLODIPine (NORVASC) 5 mg tablet; Take 1 tablet (5 mg total) by mouth daily    Vascular dementia with behavior disturbance (HCC)  -     citalopram (CeleXA) 20 mg tablet; Take 1 tablet (20 mg total) by mouth daily          Subjective:      Patient ID: Mehnaz Messer is a 80 y o  female  Presents with niece who is providing care  Patient very confused and likely needs placement  The following portions of the patient's history were reviewed and updated as appropriate: allergies, current medications, past family history, past medical history, past social history, past surgical history and problem list     Review of Systems   Constitutional: Negative for fatigue and fever  HENT: Negative for hearing loss  Eyes: Negative for visual disturbance  Respiratory: Negative for cough, chest tightness, shortness of breath and wheezing  Cardiovascular: Negative for chest pain, palpitations and leg swelling  Gastrointestinal: Negative for abdominal pain, diarrhea and nausea  Genitourinary: Negative for dysuria and hematuria  Musculoskeletal: Negative for arthralgias  Neurological: Negative for dizziness, numbness and headaches  Psychiatric/Behavioral: Negative for confusion and dysphoric mood  All other systems reviewed and are negative  Objective:      /64   Pulse 81   Temp 99 °F (37 2 °C) (Tympanic)   Ht 5' 6" (1 676 m)   Wt 52 2 kg (115 lb)   LMP  (LMP Unknown)   SpO2 95%   BMI 18 56 kg/m²          Physical Exam   Constitutional: She is oriented to person, place, and time  No distress  Eyes: EOM are normal    Neck: Normal range of motion  Cardiovascular: Normal rate and regular rhythm     Pulmonary/Chest: Effort normal and breath sounds normal    Musculoskeletal: Normal range of motion  Neurological: She is alert and oriented to person, place, and time  Skin: No rash noted  Psychiatric: She has a normal mood and affect  Thought content normal    Nursing note and vitals reviewed

## 2021-01-01 ENCOUNTER — TELEPHONE (OUTPATIENT)
Dept: FAMILY MEDICINE CLINIC | Facility: HOSPITAL | Age: 86
End: 2021-01-01

## 2021-01-01 ENCOUNTER — IMMUNIZATIONS (OUTPATIENT)
Dept: FAMILY MEDICINE CLINIC | Facility: HOSPITAL | Age: 86
End: 2021-01-01

## 2021-01-01 ENCOUNTER — OFFICE VISIT (OUTPATIENT)
Dept: FAMILY MEDICINE CLINIC | Facility: HOSPITAL | Age: 86
End: 2021-01-01
Payer: MEDICARE

## 2021-01-01 ENCOUNTER — APPOINTMENT (EMERGENCY)
Dept: CT IMAGING | Facility: HOSPITAL | Age: 86
End: 2021-01-01
Payer: MEDICARE

## 2021-01-01 ENCOUNTER — APPOINTMENT (EMERGENCY)
Dept: RADIOLOGY | Facility: HOSPITAL | Age: 86
End: 2021-01-01
Payer: MEDICARE

## 2021-01-01 ENCOUNTER — HOSPITAL ENCOUNTER (EMERGENCY)
Facility: HOSPITAL | Age: 86
Discharge: HOME | End: 2021-05-19
Attending: EMERGENCY MEDICINE
Payer: MEDICARE

## 2021-01-01 VITALS
WEIGHT: 114.8 LBS | HEART RATE: 75 BPM | BODY MASS INDEX: 17 KG/M2 | DIASTOLIC BLOOD PRESSURE: 72 MMHG | SYSTOLIC BLOOD PRESSURE: 152 MMHG | HEIGHT: 69 IN | OXYGEN SATURATION: 97 %

## 2021-01-01 VITALS
HEART RATE: 66 BPM | DIASTOLIC BLOOD PRESSURE: 68 MMHG | SYSTOLIC BLOOD PRESSURE: 155 MMHG | WEIGHT: 114 LBS | TEMPERATURE: 97.3 F | BODY MASS INDEX: 16.83 KG/M2 | OXYGEN SATURATION: 100 % | RESPIRATION RATE: 22 BRPM

## 2021-01-01 VITALS
SYSTOLIC BLOOD PRESSURE: 120 MMHG | BODY MASS INDEX: 16.83 KG/M2 | DIASTOLIC BLOOD PRESSURE: 68 MMHG | HEIGHT: 69 IN | HEART RATE: 72 BPM | OXYGEN SATURATION: 98 %

## 2021-01-01 VITALS
HEART RATE: 68 BPM | SYSTOLIC BLOOD PRESSURE: 122 MMHG | OXYGEN SATURATION: 99 % | DIASTOLIC BLOOD PRESSURE: 68 MMHG | HEIGHT: 69 IN | BODY MASS INDEX: 16.88 KG/M2 | WEIGHT: 114 LBS

## 2021-01-01 DIAGNOSIS — I10 ESSENTIAL HYPERTENSION: Chronic | ICD-10-CM

## 2021-01-01 DIAGNOSIS — I10 ESSENTIAL HYPERTENSION: Primary | Chronic | ICD-10-CM

## 2021-01-01 DIAGNOSIS — S01.01XA LACERATION OF SCALP, INITIAL ENCOUNTER: ICD-10-CM

## 2021-01-01 DIAGNOSIS — L02.416 CELLULITIS AND ABSCESS OF LEFT LEG: ICD-10-CM

## 2021-01-01 DIAGNOSIS — F01.51 VASCULAR DEMENTIA WITH BEHAVIOR DISTURBANCE (HCC): ICD-10-CM

## 2021-01-01 DIAGNOSIS — Z23 ENCOUNTER FOR IMMUNIZATION: Primary | ICD-10-CM

## 2021-01-01 DIAGNOSIS — S01.81XA LACERATION OF FOREHEAD, INITIAL ENCOUNTER: Primary | ICD-10-CM

## 2021-01-01 DIAGNOSIS — I21.4 NON-ST ELEVATION MYOCARDIAL INFARCTION (NSTEMI) (HCC): ICD-10-CM

## 2021-01-01 DIAGNOSIS — R55 PRE-SYNCOPE: ICD-10-CM

## 2021-01-01 DIAGNOSIS — S91.109S OPEN TOE WOUND, SEQUELA: ICD-10-CM

## 2021-01-01 DIAGNOSIS — Z48.02 VISIT FOR SUTURE REMOVAL: ICD-10-CM

## 2021-01-01 DIAGNOSIS — S06.2X0S: Primary | ICD-10-CM

## 2021-01-01 DIAGNOSIS — R62.7 FAILURE TO THRIVE IN ADULT: ICD-10-CM

## 2021-01-01 DIAGNOSIS — R54 ADVANCED AGE: ICD-10-CM

## 2021-01-01 DIAGNOSIS — R54 ADVANCED AGE: Primary | ICD-10-CM
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
## 2021-01-01 DIAGNOSIS — L03.116 CELLULITIS AND ABSCESS OF LEFT LEG: ICD-10-CM

## 2021-01-01 LAB
ANION GAP SERPL CALCULATED.3IONS-SCNC: 10 MMOL/L (ref 4–13)
ATRIAL RATE: 66 BPM
BASOPHILS # BLD AUTO: 0.05 THOUSANDS/ΜL (ref 0–0.1)
BASOPHILS NFR BLD AUTO: 1 % (ref 0–1)
BUN SERPL-MCNC: 38 MG/DL (ref 5–25)
CALCIUM SERPL-MCNC: 9.6 MG/DL (ref 8.3–10.1)
CHLORIDE SERPL-SCNC: 105 MMOL/L (ref 100–108)
CK SERPL-CCNC: 72 U/L (ref 26–192)
CO2 SERPL-SCNC: 26 MMOL/L (ref 21–32)
CREAT SERPL-MCNC: 0.83 MG/DL (ref 0.6–1.3)
EOSINOPHIL # BLD AUTO: 0.15 THOUSAND/ΜL (ref 0–0.61)
EOSINOPHIL NFR BLD AUTO: 2 % (ref 0–6)
ERYTHROCYTE [DISTWIDTH] IN BLOOD BY AUTOMATED COUNT: 13.4 % (ref 11.6–15.1)
GFR SERPL CREATININE-BSD FRML MDRD: 60 ML/MIN/1.73SQ M
GLUCOSE SERPL-MCNC: 107 MG/DL (ref 65–140)
HCT VFR BLD AUTO: 40.2 % (ref 34.8–46.1)
HGB BLD-MCNC: 13.1 G/DL (ref 11.5–15.4)
IMM GRANULOCYTES # BLD AUTO: 0.03 THOUSAND/UL (ref 0–0.2)
IMM GRANULOCYTES NFR BLD AUTO: 0 % (ref 0–2)
LYMPHOCYTES # BLD AUTO: 1.02 THOUSANDS/ΜL (ref 0.6–4.47)
LYMPHOCYTES NFR BLD AUTO: 11 % (ref 14–44)
MCH RBC QN AUTO: 30.3 PG (ref 26.8–34.3)
MCHC RBC AUTO-ENTMCNC: 32.6 G/DL (ref 31.4–37.4)
MCV RBC AUTO: 93 FL (ref 82–98)
MONOCYTES # BLD AUTO: 0.82 THOUSAND/ΜL (ref 0.17–1.22)
MONOCYTES NFR BLD AUTO: 9 % (ref 4–12)
NEUTROPHILS # BLD AUTO: 6.99 THOUSANDS/ΜL (ref 1.85–7.62)
NEUTS SEG NFR BLD AUTO: 77 % (ref 43–75)
NRBC BLD AUTO-RTO: 0 /100 WBCS
PLATELET # BLD AUTO: 218 THOUSANDS/UL (ref 149–390)
PMV BLD AUTO: 11.3 FL (ref 8.9–12.7)
POTASSIUM SERPL-SCNC: 4.4 MMOL/L (ref 3.5–5.3)
QRS AXIS: 2 DEGREES
QRSD INTERVAL: 78 MS
QT INTERVAL: 414 MS
QTC INTERVAL: 434 MS
RBC # BLD AUTO: 4.32 MILLION/UL (ref 3.81–5.12)
SODIUM SERPL-SCNC: 141 MMOL/L (ref 136–145)
T WAVE AXIS: 23 DEGREES
VENTRICULAR RATE: 66 BPM
WBC # BLD AUTO: 9.06 THOUSAND/UL (ref 4.31–10.16)

## 2021-01-01 PROCEDURE — 70450 CT HEAD/BRAIN W/O DYE: CPT

## 2021-01-01 PROCEDURE — 90471 IMMUNIZATION ADMIN: CPT

## 2021-01-01 PROCEDURE — 90715 TDAP VACCINE 7 YRS/> IM: CPT | Performed by: EMERGENCY MEDICINE

## 2021-01-01 PROCEDURE — 12011 RPR F/E/E/N/L/M 2.5 CM/<: CPT | Performed by: EMERGENCY MEDICINE

## 2021-01-01 PROCEDURE — 72125 CT NECK SPINE W/O DYE: CPT

## 2021-01-01 PROCEDURE — 91300 SARS-COV-2 / COVID-19 MRNA VACCINE (PFIZER-BIONTECH) 30 MCG: CPT

## 2021-01-01 PROCEDURE — 99213 OFFICE O/P EST LOW 20 MIN: CPT | Performed by: INTERNAL MEDICINE

## 2021-01-01 PROCEDURE — 93010 ELECTROCARDIOGRAM REPORT: CPT | Performed by: INTERNAL MEDICINE

## 2021-01-01 PROCEDURE — 71045 X-RAY EXAM CHEST 1 VIEW: CPT

## 2021-01-01 PROCEDURE — 0001A SARS-COV-2 / COVID-19 MRNA VACCINE (PFIZER-BIONTECH) 30 MCG: CPT

## 2021-01-01 PROCEDURE — 36415 COLL VENOUS BLD VENIPUNCTURE: CPT | Performed by: EMERGENCY MEDICINE

## 2021-01-01 PROCEDURE — 80048 BASIC METABOLIC PNL TOTAL CA: CPT | Performed by: EMERGENCY MEDICINE

## 2021-01-01 PROCEDURE — 99282 EMERGENCY DEPT VISIT SF MDM: CPT | Performed by: EMERGENCY MEDICINE

## 2021-01-01 PROCEDURE — 93005 ELECTROCARDIOGRAM TRACING: CPT

## 2021-01-01 PROCEDURE — 85025 COMPLETE CBC W/AUTO DIFF WBC: CPT | Performed by: EMERGENCY MEDICINE

## 2021-01-01 PROCEDURE — 99285 EMERGENCY DEPT VISIT HI MDM: CPT

## 2021-01-01 PROCEDURE — 99214 OFFICE O/P EST MOD 30 MIN: CPT | Performed by: INTERNAL MEDICINE

## 2021-01-01 PROCEDURE — 0002A SARS-COV-2 / COVID-19 MRNA VACCINE (PFIZER-BIONTECH) 30 MCG: CPT

## 2021-01-01 PROCEDURE — 82550 ASSAY OF CK (CPK): CPT | Performed by: EMERGENCY MEDICINE

## 2021-01-01 RX ORDER — CEPHALEXIN 500 MG/1
500 CAPSULE ORAL EVERY 12 HOURS SCHEDULED
Qty: 14 CAPSULE | Refills: 0 | Status: SHIPPED | OUTPATIENT
Start: 2021-01-01 | End: 2021-01-01

## 2021-01-01 RX ORDER — CITALOPRAM 20 MG/1
20 TABLET ORAL DAILY
Qty: 90 TABLET | Refills: 3 | Status: SHIPPED | OUTPATIENT
Start: 2021-01-01

## 2021-01-01 RX ORDER — LISINOPRIL 20 MG/1
20 TABLET ORAL DAILY
Qty: 90 TABLET | Refills: 3 | Status: SHIPPED | OUTPATIENT
Start: 2021-01-01

## 2021-01-01 RX ORDER — LIDOCAINE HYDROCHLORIDE AND EPINEPHRINE 10; 10 MG/ML; UG/ML
10 INJECTION, SOLUTION INFILTRATION; PERINEURAL ONCE
Status: COMPLETED | OUTPATIENT
Start: 2021-01-01 | End: 2021-01-01

## 2021-01-01 RX ORDER — AMLODIPINE BESYLATE 5 MG/1
5 TABLET ORAL DAILY
Qty: 90 TABLET | Refills: 3 | Status: SHIPPED | OUTPATIENT
Start: 2021-01-01 | End: 2021-01-01 | Stop reason: SDUPTHER

## 2021-01-01 RX ORDER — AMLODIPINE BESYLATE 5 MG/1
5 TABLET ORAL DAILY
Qty: 90 TABLET | Refills: 3 | Status: SHIPPED | OUTPATIENT
Start: 2021-01-01

## 2021-01-01 RX ADMIN — TETANUS TOXOID, REDUCED DIPHTHERIA TOXOID AND ACELLULAR PERTUSSIS VACCINE, ADSORBED 0.5 ML: 5; 2.5; 8; 8; 2.5 SUSPENSION INTRAMUSCULAR at 18:53

## 2021-01-01 RX ADMIN — LIDOCAINE HYDROCHLORIDE,EPINEPHRINE BITARTRATE 10 ML: 10; .01 INJECTION, SOLUTION INFILTRATION; PERINEURAL at 18:01

## 2021-03-09 NOTE — PROGRESS NOTES
Subjective:   No chief complaint on file  Patient ID: Jacquie Santos is a 80 y o  female  Elderly and frail  Routine visit today  Hard of hearing and confused  Has dementia and cared for by nephew and niece  Long discussion today with niece  Patient has worsening dementia, has paranoid ideas, is uncooperative with hygiene  Has balance difficulties and refuses to use cane or walker  It is increasingly clear that she needs 24/7 supervision and nursing home placement was discussed  I strongly advise pursuing this course  Gave phone # for covid registration       The following portions of the patient's history were reviewed and updated as appropriate: allergies, current medications, past family history, past medical history, past social history, past surgical history and problem list     Review of Systems   Unable to perform ROS: Dementia   All other systems reviewed and are negative  Current Outpatient Medications on File Prior to Visit   Medication Sig Dispense Refill    acetaminophen (TYLENOL) 325 mg tablet Take 650 mg by mouth every 6 (six) hours as needed for mild pain      amLODIPine (NORVASC) 5 mg tablet Take 1 tablet (5 mg total) by mouth daily 90 tablet 3    aspirin 81 MG tablet Take 1 tablet by mouth daily      bimatoprost (LUMIGAN) 0 01 % ophthalmic drops Administer 1 drop to both eyes daily at bedtime      citalopram (CeleXA) 20 mg tablet Take 1 tablet (20 mg total) by mouth daily 90 tablet 3    docusate sodium (COLACE) 100 mg capsule Take 1 capsule (100 mg total) by mouth 2 (two) times a day 10 capsule 0    dorzolamide-timolol (COSOPT) 22 3-6 8 MG/ML ophthalmic solution Apply to eye Twice daily      lisinopril (ZESTRIL) 20 mg tablet Take 1 tablet (20 mg total) by mouth daily 90 tablet 3    nystatin-triamcinolone (MYCOLOG-II) ointment Apply topically 2 (two) times a day 30 g 0     No current facility-administered medications on file prior to visit  Objective: There were no vitals filed for this visit  Physical Exam  Vitals signs and nursing note reviewed  Constitutional:       General: She is not in acute distress  Neck:      Musculoskeletal: Normal range of motion  Cardiovascular:      Rate and Rhythm: Normal rate and regular rhythm  Pulmonary:      Effort: Pulmonary effort is normal       Breath sounds: Normal breath sounds  Musculoskeletal: Normal range of motion  Skin:     Findings: No rash  Neurological:      Mental Status: She is alert and oriented to person, place, and time  Psychiatric:         Thought Content:  Thought content normal             Assessment/Plan:         Diagnoses and all orders for this visit:    Essential hypertension    Non-ST elevation myocardial infarction (NSTEMI) [facility])    Vascular dementia with behavior disturbance [facility])    Advanced age

## 2021-04-19 NOTE — TELEPHONE ENCOUNTER
Hira Rosen states pt needs a refill of amLODIPine (NORVASC) 5 mg tablet   Sent to [address] [place] [address]

## 2021-05-19 NOTE — DISCHARGE INSTRUCTIONS
Facial Laceration   WHAT YOU NEED TO KNOW:   A facial laceration is a tear or cut in the skin  Facial lacerations may be closed within 24 hours of injury  DISCHARGE INSTRUCTIONS:   Return to the emergency department if:   · You have a fever and the wound is painful, warm, or swollen  The wound area may be red, or fluid may come out of it  · You have heavy bleeding or bleeding that does not stop after 10 minutes of holding firm, direct pressure over the wound  Call your doctor if:   · Your wound reopens or your tape comes off  · Your wound is very painful  · Your wound is not healing, or you think there is an object in the wound  · The skin around your wound stays numb  · You have questions or concerns about your condition or care  Medicines:   · Antibiotics  may be given to prevent an infection if your wound was deep and had to be cleaned out  · Take your medicine as directed  Contact your healthcare provider if you think your medicine is not helping or if you have side effects  Tell him of her if you are allergic to any medicine  Keep a list of the medicines, vitamins, and herbs you take  Include the amounts, and when and why you take them  Bring the list or the pill bottles to follow-up visits  Carry your medicine list with you in case of an emergency  Care for your wound:  Care for your wound as directed to prevent infection and help it heal  Wash your hands with soap and warm water before and after you care for your wound  You may need to keep the wound dry for the first 24 to 48 hours  When your healthcare provider says it is okay, wash around your wound with soap and water, or as directed  Gently pat the area dry  Do not use alcohol or hydrogen peroxide to clean your wound unless you are directed to  · Do not take aspirin or NSAIDs for 24 hours after being injured  Aspirin and NSAIDs can increase blood flow  Your laceration may continue to bleed       · Do not take hot showers, eat or drink hot foods and liquids for 48 hours after being injured  Also, do not use a heating pad near your laceration  The heat can cause swelling in and around your laceration  · If your wound was covered with a bandage,  leave your bandage on as long as directed  Bandages keep your wound clean and protected  They can also prevent swelling  Ask when and how to change your bandage  Be careful not to apply the bandage or tape too tightly  This could cut off blood flow and cause more injury  · If your wound was closed with stitches,  keep your wound clean  Your healthcare provider may recommend that you apply antibiotic ointment after you clean your wound  · If your wound was closed with wound tape or medical strips,  keep the area clean and dry  The strips will usually fall off on their own after several days  · If your wound was closed with tissue glue,  do not use any ointments or lotions on the area  You may shower, but do not swim or soak in a bathtub  Gently pat the area dry after you take a shower  Do not pick at or scrub the glue area  Decrease scarring: The skin in the area of your wound may turn a different color if it is exposed to direct sunlight  After your wound is healed, use sunscreen over the area when you are out in the sun  You should do this for at least 6 months to 1 year after your injury  Some wounds scar less if they are covered while they heal   Follow up with your doctor as directed: You may need to follow up with your healthcare provider in 24 to 48 hours to have your wound checked for infection  You may need to return in 3 to 5 days if you have stitches that need to be removed  Write down your questions so you remember to ask them during your visits  © Copyright [facility] [address] Information is for End User's use only and may not be sold, redistributed or otherwise used for commercial purposes   All illustrations and images included in CareNotes® are the copyrighted property of A D A M , Inc  or [address]   The above information is an  only  It is not intended as medical advice for individual conditions or treatments  Talk to your doctor, nurse or pharmacist before following any medical regimen to see if it is safe and effective for you  Fall Prevention for Older Adults   WHAT YOU NEED TO KNOW:   As you age, your muscles weaken and your risk for falls increases  Your risk also increases if you take medicines that make you sleepy or dizzy  You may also be at risk if you have vision or joint problems, have low blood pressure, or are not active  DISCHARGE INSTRUCTIONS:   Call 911 or have someone else call if:   · You have fallen and are unconscious  · You have fallen and cannot move part of your body  Contact your healthcare provider if:   · You have fallen and have pain or a headache  · You have questions or concerns about your condition or care  Fall prevention tips:   · Stay active  Exercise can help strengthen your muscles and improve your balance  Your healthcare provider may recommend water aerobics, walking, or Cristino Chi  He or she may also recommend physical therapy to improve your coordination  Never start an exercise program without asking your healthcare provider first             · Wear shoes that fit well and have soles that   Wear shoes both inside and outside  Use slippers with good   Avoid shoes with high heels  · Use assistive devices as directed  Your healthcare provider may suggest that you use a cane or walker to help you keep your balance  You may need to have grab bars put in your bathroom near the toilet or in the shower  · Stand or sit up slowly  This may help you keep your balance and prevent falls  · Wear a personal alarm  This is a device that allows you to call 911 if you need help  Ask for more information on personal alarms  · Manage your medical conditions    Keep all appointments with your healthcare providers  Visit your eye doctor as directed  Home safety tips:       · Add items to prevent falls in the bathroom  Put nonslip strips on your bath or shower floor to prevent you from slipping  Use a bath mat if you do not have carpet in the bathroom  This will prevent you from falling when you step out of the bath or shower  Use a shower seat so you do not need to stand while you shower  Sit on the toilet or a chair in your bathroom to dry yourself and put on clothing  This will prevent you from losing your balance from drying or dressing yourself while you are standing  · Keep paths clear  Remove books, shoes, and other objects from walkways and stairs  Place cords for telephones and lamps out of the way so that you do not need to walk over them  Tape them down if you cannot move them  Remove small rugs  If you cannot remove a rug, secure it with double-sided tape  This will prevent you from tripping  · Install bright lights in your home  Use night lights to help light paths to the bathroom or kitchen  Always turn on the light before you start walking  · Keep items you use often on shelves within reach  Do not use a step stool to help you reach an item  · Paint or place reflective tape on the edges of your stairs  This will help you see the stairs better  Follow up with your healthcare provider as directed:  Write down your questions so you remember to ask them during your visits  © Copyright [employer] Information is for End User's use only and may not be sold, redistributed or otherwise used for commercial purposes  All illustrations and images included in CareNotes® are the copyrighted property of A D A M , Inc  or [facility] Miguelina Jackson   The above information is an  only  It is not intended as medical advice for individual conditions or treatments   Talk to your doctor, nurse or pharmacist before following any medical regimen to see if it is safe and effective for you

## 2021-05-19 NOTE — ED PROVIDER NOTES
Emergency Department Trauma Note  Lillian Rodriguez 80 y o  female MRN: [id number]  Unit/Bed#: ED 03/ED 03 Encounter: [id number]      Trauma Alert: Trauma Acuity: Trauma Evaluation  Model of Arrival: Mode of Arrival: BLS via    Trauma Team: Current Providers  Attending Provider: Shahzad Piña MD  Registered Nurse: Celia Horn RN  Consultants: None      History of Present Illness     Chief Complaint:   Chief Complaint   Patient presents with   Vernona Merlin     To ED via EMS for evaluation after falling at home  EMSeports that patient tripped and fell hitting head  Lacerations to left forhead and scalp  EMS reports that patient called family who were there immediatly and called 911  HPI:  Lillian Rodriguez is a 80 y o  female who presents with forehead laceration after unwitnessed fall  Mechanism:Details of Incident: EMS states that patient fell and notified family immediately Injury Date: 05/19/21   Injury Occurence Location - [address] [place] [address] Way: unknown time and location    80year old female brought by EMS for evaluation after reported trip and fall at home  Patient is very hard of hearing  She lives with two nephews who had reportedly been home at the time of the fall; however, the fall was unwitnessed  Patient believes she struck her head on either the refrigerator or an antique stove during the fall  No reported loss of consciousness  Patient takes daily aspirin  According to family, patient has had frequent falls  She uses a cane at baseline, but had been advised to use a walker due to frequent falls which she only occasionally uses in the restroom and getting out of bed        History provided by:  Patient, EMS personnel and relative  Fall  Mechanism of injury: fall    Injury location:  Face and head/neck  Head/neck injury location:  Scalp  Facial injury location:  Forehead  Incident location:  Kitchen  Time since incident:  1 hour  Fall:     Fall occurred:  Standing    Height of fall:  Ground level Impact surface: stove or refrigerator  Point of impact:  Head  Suspicion of alcohol use: no    Suspicion of drug use: no    Tetanus status:  Out of date  Prior to arrival data:     Patient ambulatory at scene: yes      Blood loss:  Minimal    Loss of consciousness: no      Amnesic to event: no      Fluids administered:  None    Medications administered:  None    Immobilization:  None  Current pain details:     Pain Severity:  No pain  Associated symptoms: no headaches, no nausea and no vomiting    Risk factors: no anticoagulation therapy    Risk factors comment:  Aspirin    Review of Systems   Constitutional: Negative for appetite change (poor appetite, chronic, eats approximately 1 meal/day) and fatigue  HENT: Negative for congestion  Respiratory: Negative for cough and shortness of breath  Gastrointestinal: Negative for diarrhea, nausea and vomiting  Musculoskeletal: Positive for gait problem (chronic, uses cane, but advised to use a walker due to frequent falls)  Skin: Positive for wound  Neurological: Negative for syncope and headaches  All other systems reviewed and are negative  Historical Information     Immunizations:   Immunization History   Administered Date(s) Administered    INFLUENZA 10/19/2015, 10/06/2016, 10/16/2017    Influenza Split High Dose Preservative Free IM 11/24/2014, 10/19/2015, 10/06/2016, 10/16/2017    Influenza, high dose seasonal 0 7 mL 10/15/2018, 10/17/2019    Influenza, seasonal, injectable 10/21/2010, 09/20/2011, 10/05/2012, 11/09/2013    Pneumococcal Conjugate 13-Valent 10/16/2017    Pneumococcal Polysaccharide PPV23 06/15/2011    SARS-CoV-2 / COVID-19 mRNA IM (Pfizer-BioNTech) 03/26/2021, 04/19/2021    Tdap 05/19/2021       History reviewed  No pertinent past medical history      Family History   Problem Relation Age of Onset   Colleen Mani Cancer Mother     Hypertension Mother     Hypertension Father     Diabetes Sister     Colon cancer Family     Stroke Family         ischemic      History reviewed  No pertinent surgical history  Social History     Tobacco Use    Smoking status: Never Smoker    Smokeless tobacco: Never Used    Tobacco comment: non smoker    Substance Use Topics    Alcohol use: No    Drug use: No     E-Cigarette/Vaping    E-Cigarette Use Never User      E-Cigarette/Vaping Substances    Nicotine No     THC No     CBD No     Flavoring No     Other No     Unknown No        Family History: non-contributory    Meds/Allergies   Prior to Admission Medications   Prescriptions Last Dose Informant Patient Reported?  Taking?   acetaminophen (TYLENOL) 325 mg tablet   Yes No   Sig: Take 650 mg by mouth every 6 (six) hours as needed for mild pain   amLODIPine (NORVASC) 5 mg tablet   No No   Sig: Take 1 tablet (5 mg total) by mouth daily   aspirin 81 MG tablet   Yes No   Sig: Take 1 tablet by mouth daily   bimatoprost (LUMIGAN) 0 01 % ophthalmic drops   Yes No   Sig: Administer 1 drop to both eyes daily at bedtime   citalopram (CeleXA) 20 mg tablet   No No   Sig: Take 1 tablet (20 mg total) by mouth daily   docusate sodium (COLACE) 100 mg capsule   No No   Sig: Take 1 capsule (100 mg total) by mouth 2 (two) times a day   dorzolamide-timolol (COSOPT) 22 3-6 8 MG/ML ophthalmic solution   Yes No   Sig: Apply to eye Twice daily   lisinopril (ZESTRIL) 20 mg tablet   No No   Sig: Take 1 tablet (20 mg total) by mouth daily   nystatin-triamcinolone (MYCOLOG-II) ointment   No No   Sig: Apply topically 2 (two) times a day      Facility-Administered Medications: None       Allergies   Allergen Reactions    Penicillins     Pollen Extract     Sulfa Antibiotics        PHYSICAL EXAM    PE limited by: none    Objective   Vitals:   First set: Temperature: 98 7 °F (37 1 °C) (05/19/21 1723)  Pulse: 70 (05/19/21 1723)  Respirations: 18 (05/19/21 1723)  Blood Pressure: 164/74 (05/19/21 1723)  SpO2: 99 % (05/19/21 1723)    Primary Survey:   (A) Airway: intact  (B) Breathing: bilateral breath sounds  (C) Circulation: Pulses:   normal  (D) Disabliity:  GCS Total:  15  (E) Expose:  Completed    Secondary Survey: (Click on Physical Exam tab above)  Physical Exam  Vitals signs and nursing note reviewed  Constitutional:       General: She is not in acute distress  Appearance: She is well-developed  She is not toxic-appearing or diaphoretic  HENT:      Head: Normocephalic and atraumatic  Right Ear: External ear normal       Left Ear: External ear normal       Nose: Nose normal    Eyes:      General: No scleral icterus  Cardiovascular:      Rate and Rhythm: Normal rate and regular rhythm  Pulses: Normal pulses  Heart sounds: Normal heart sounds  Pulmonary:      Effort: Pulmonary effort is normal  No respiratory distress  Breath sounds: Normal breath sounds  Abdominal:      General: There is no distension  Musculoskeletal: Normal range of motion  General: No deformity  Comments: No midline C/T/L spine tenderness  No step offs or deformities  No tenderness or instability with compression over the pelvis  Normal ROM and tone throughout  Skin:     General: Skin is warm and dry  Findings: No rash  Neurological:      General: No focal deficit present  Mental Status: She is alert  Gait: Gait normal    Psychiatric:         Mood and Affect: Mood normal          Cervical spine cleared by clinical criteria?  No (imaging required)      Invasive Devices     Peripheral Intravenous Line            Peripheral IV 05/19/21 Right Antecubital less than 1 day                Lab Results:   Results Reviewed     Procedure Component Value Units Date/Time    CK (with reflex to MB) [[id number]]  (Normal) Collected: 05/19/21 1757    Lab Status: Final result Specimen: Blood from Arm, Right Updated: 05/19/21 1825     Total CK 72 U/L     Basic metabolic panel [[id number]]  (Abnormal) Collected: 05/19/21 1757    Lab Status: Final result Specimen: Blood from Arm, Right Updated: 05/19/21 1818     Sodium 141 mmol/L      Potassium 4 4 mmol/L      Chloride 105 mmol/L      CO2 26 mmol/L      ANION GAP 10 mmol/L      BUN 38 mg/dL      Creatinine 0 83 mg/dL      Glucose 107 mg/dL      Calcium 9 6 mg/dL      eGFR 60 ml/min/1 73sq m     Narrative:      [place] guidelines for Chronic Kidney Disease (CKD):     Stage 1 with normal or high GFR (GFR > 90 mL/min/1 73 square meters)    Stage 2 Mild CKD (GFR = 60-89 mL/min/1 73 square meters)    Stage 3A Moderate CKD (GFR = 45-59 mL/min/1 73 square meters)    Stage 3B Moderate CKD (GFR = 30-44 mL/min/1 73 square meters)    Stage 4 Severe CKD (GFR = 15-29 mL/min/1 73 square meters)    Stage 5 End Stage CKD (GFR <15 mL/min/1 73 square meters)  Note: GFR calculation is accurate only with a steady state creatinine    CBC and differential [[id number]]  (Abnormal) Collected: 05/19/21 1757    Lab Status: Final result Specimen: Blood from Arm, Right Updated: 05/19/21 1805     WBC 9 06 Thousand/uL      RBC 4 32 Million/uL      Hemoglobin 13 1 g/dL      Hematocrit 40 2 %      MCV 93 fL      MCH 30 3 pg      MCHC 32 6 g/dL      RDW 13 4 %      MPV 11 3 fL      Platelets 904 Thousands/uL      nRBC 0 /100 WBCs      Neutrophils Relative 77 %      Immat GRANS % 0 %      Lymphocytes Relative 11 %      Monocytes Relative 9 %      Eosinophils Relative 2 %      Basophils Relative 1 %      Neutrophils Absolute 6 99 Thousands/µL      Immature Grans Absolute 0 03 Thousand/uL      Lymphocytes Absolute 1 02 Thousands/µL      Monocytes Absolute 0 82 Thousand/µL      Eosinophils Absolute 0 15 Thousand/µL      Basophils Absolute 0 05 Thousands/µL                  Imaging Studies:   Direct to CT: Yes  TRAUMA - CT head wo contrast   Final Result by Yunior Diallo DO (05/19 1809)   Limited evaluation of the transverse processes of C1 due to artifact  No acute intracranial abnormality        Because this is a trauma evaluation: The study was marked in EPIC for immediate notification  Workstation performed: [id number]         TRAUMA - CT spine cervical wo contrast   Final Result by Natalie Clayton DO (05/19 1810)   Limited evaluation of C1 due to artifact  No cervical spine fracture or traumatic malalignment  Because this is a trauma evaluation: The study was marked in EPIC for immediate notification  Workstation performed: [id number]         XR Trauma chest portable   ED Interpretation by Verna Gutierrez MD (05/19 0708)   No acute pulmonary pathology  No displaced rib fractures  Procedures  ECG 12 Lead Documentation Only    Date/Time: 5/19/2021 5:48 PM  Performed by: Verna Gutierrez MD  Authorized by: Verna Gutierrez MD     Indications / Diagnosis:  Fall  ECG reviewed by me, the ED Provider: yes    Patient location:  ED  Previous ECG:     Previous ECG:  Compared to current    Comparison ECG info:  10/22/18 sinus bradycardia with twave flattening in III and aVF    Similarity:  No change  Interpretation:     Interpretation: non-specific    Rate:     ECG rate:  66    ECG rate assessment: normal    Rhythm:     Rhythm: sinus rhythm    Ectopy:     Ectopy: none    QRS:     QRS axis:  Normal    QRS intervals:  Normal  Conduction:     Conduction: normal    ST segments:     ST segments:  Normal  T waves:     T waves: flattening      Flattening:  III and aVL  Laceration repair    Date/Time: 5/19/2021 6:35 PM  Performed by: Verna Gutierrez MD  Authorized by: Verna Gutierrez MD   Consent: Verbal consent obtained    Risks and benefits: risks, benefits and alternatives were discussed  Consent given by: patient  Required items: required blood products, implants, devices, and special equipment available  Patient identity confirmed: arm band  Body area: head/neck  Location details: forehead  Laceration length: 1 cm  Foreign bodies: no foreign bodies  Tendon involvement: none  Nerve involvement: none  Anesthesia: local infiltration    Anesthesia:  Local Anesthetic: lidocaine 1% with epinephrine  Anesthetic total: 2 mL      Procedure Details:  Preparation: Patient was prepped and draped in the usual sterile fashion  Irrigation solution: saline  Irrigation method: syringe  Amount of cleaning: standard  Debridement: none  Degree of undermining: none  Skin closure: 6-0 nylon  Number of sutures: 2  Technique: simple  Approximation: close  Approximation difficulty: simple  Patient tolerance: patient tolerated the procedure well with no immediate complications    Laceration repair    Date/Time: 5/19/2021 6:35 PM  Performed by: Martin Mcintyre MD  Authorized by: Martin Mcintyre MD   Consent: Verbal consent obtained  Risks and benefits: risks, benefits and alternatives were discussed  Consent given by: patient  Radiology Images displayed and confirmed  If images not available, report reviewed: imaging studies available  Patient identity confirmed: arm band  Body area: head/neck  Location details: scalp  Laceration length: 1 5 cm  Foreign bodies: no foreign bodies  Tendon involvement: none  Nerve involvement: none  Vascular damage: no  Anesthesia: local infiltration    Anesthesia:  Local Anesthetic: lidocaine 1% with epinephrine  Anesthetic total: 2 mL      Procedure Details:  Preparation: Patient was prepped and draped in the usual sterile fashion    Irrigation solution: saline  Irrigation method: syringe  Amount of cleaning: standard  Debridement: none  Degree of undermining: none  Skin closure: staples (3)  Approximation: close  Approximation difficulty: simple  Patient tolerance: patient tolerated the procedure well with no immediate complications               ED Course           MDM  Number of Diagnoses or Management Options  Fall, initial encounter: new and requires workup  Laceration of forehead, initial encounter: new and requires workup  Laceration of scalp, initial encounter: new and requires workup  Diagnosis management comments: 80year old female presents for evaluation after unwitnessed fall  CTH and C-spine unremarkable  Labs unremarkable  Wounds repaired at bedside  Per medical records, patient had refused tdap in the past  She is agreeable to having this vaccine today  First dose given in ED  Advised to follow up with PCP in 4 weeks for booster if this is indeed her first tetanus vaccine dose  Return precautions provided  Amount and/or Complexity of Data Reviewed  Clinical lab tests: ordered and reviewed  Tests in the radiology section of CPT®: ordered and reviewed  Independent visualization of images, tracings, or specimens: yes    Patient Progress  Patient progress: stable          Disposition  [facility] Transfer: No  Final diagnoses:   Laceration of forehead, initial encounter   Laceration of scalp, initial encounter   Fall, initial encounter     Time reflects when diagnosis was documented in both MDM as applicable and the Disposition within this note     Time User Action Codes Description Comment    5/19/2021  6:39 PM Delcia Saver Add [S01 81XA] Laceration of forehead, initial encounter     5/19/2021  6:39 PM Delcia Saver Add [S01 [id number]] Laceration of scalp, initial encounter     5/19/2021  6:54 PM Delcia Saver Add [N68  Blayneclaudy Luna, initial encounter       ED Disposition     ED Disposition Condition Date/Time Comment    Discharge Stable Wed May 19, 2021  6:39 PM Echo Tomas discharge to home/self care              Follow-up Information     Follow up With Specialties Details Why Contact Info Additional Information    Candice Leach MD Internal Medicine In 1 week For suture and staple removal [place]  [address] [facility]  [id number] [place] [address] Drive [address] [address] [facility] Emergency Department Emergency Medicine Go to  If symptoms worsen, severe headache, vomiting, numbness or weakness [address] [place] [facility] Emergency Department, [address] [facility], [facility] [address]    Marina Patrick MD Internal Medicine In 4 weeks for tetanus booster if you have never been previously vaccinated [place] [phone]           Patient's Medications   Discharge Prescriptions    No medications on file     No discharge procedures on file      PDMP Review     None          ED Provider  Electronically Signed by         Ridge Newsome MD  05/19/21 8220       Ridge Newsome MD  05/19/21 9212

## 2021-05-27 PROBLEM — S06.2X9A LACERATION AND CONTUSION OF CEREBRAL CORTEX (HCC): Status: ACTIVE | Noted: 2021-01-01

## 2021-05-27 NOTE — PROGRESS NOTES
Subjective:   Chief Complaint   Patient presents with    Suture / Staple Removal     2 stitches above her eye and 3 staples on her head         Patient ID: Lillian Marcos is a 80 y o  female  Encounter for suture removal  Well healed laceration on scalp and forehead  Removed 2 sutures at left eyebrow and 3 staples in scalp  Patient tolerated well  Note left knee swollen and bruised  Some redness and swelling  Patient states "it hurts"  Suture / Staple Removal  The sutures were placed 7 to 10 days ago  She tried nothing since the wound repair  The treatment provided significant relief  The maximum temperature noted was less than 100 4 F  There has been no drainage from the wound  There is no pain present  The following portions of the patient's history were reviewed and updated as appropriate: allergies, current medications, past family history, past medical history, past social history, past surgical history and problem list     Review of Systems   Constitutional: Negative for fatigue and fever  HENT: Negative for hearing loss  Eyes: Negative for visual disturbance  Respiratory: Negative for cough, chest tightness, shortness of breath and wheezing  Cardiovascular: Negative for chest pain, palpitations and leg swelling  Gastrointestinal: Negative for abdominal pain, diarrhea and nausea  Genitourinary: Negative for dysuria and hematuria  Musculoskeletal: Negative for arthralgias  Neurological: Negative for dizziness, numbness and headaches  Psychiatric/Behavioral: Negative for confusion and dysphoric mood  All other systems reviewed and are negative          Current Outpatient Medications on File Prior to Visit   Medication Sig Dispense Refill    acetaminophen (TYLENOL) 325 mg tablet Take 650 mg by mouth every 6 (six) hours as needed for mild pain      amLODIPine (NORVASC) 5 mg tablet Take 1 tablet (5 mg total) by mouth daily 90 tablet 3    aspirin 81 MG tablet Take 1 tablet by mouth daily      bimatoprost (LUMIGAN) 0 01 % ophthalmic drops Administer 1 drop to both eyes daily at bedtime      citalopram (CeleXA) 20 mg tablet Take 1 tablet (20 mg total) by mouth daily 90 tablet 3    docusate sodium (COLACE) 100 mg capsule Take 1 capsule (100 mg total) by mouth 2 (two) times a day 10 capsule 0    dorzolamide-timolol (COSOPT) 22 3-6 8 MG/ML ophthalmic solution Apply to eye Twice daily      lisinopril (ZESTRIL) 20 mg tablet Take 1 tablet (20 mg total) by mouth daily 90 tablet 3    nystatin-triamcinolone (MYCOLOG-II) ointment Apply topically 2 (two) times a day 30 g 0     No current facility-administered medications on file prior to visit  Objective:  Vitals:    05/27/21 1252   BP: 122/68   Pulse: 68   SpO2: 99%   Weight: 51 7 kg (114 lb)   Height: 5' 9" (1 753 m)      Physical Exam  Vitals signs and nursing note reviewed  Constitutional:       General: She is not in acute distress  Neck:      Musculoskeletal: Normal range of motion  Cardiovascular:      Rate and Rhythm: Normal rate and regular rhythm  Pulmonary:      Effort: Pulmonary effort is normal       Breath sounds: Normal breath sounds  Musculoskeletal: Normal range of motion  Skin:     Findings: Erythema (left knee warm and red with ecchymotic area below knee) present  No rash  Neurological:      Mental Status: She is alert and oriented to person, place, and time  Psychiatric:         Thought Content: Thought content normal            Assessment/Plan:    No problem-specific Assessment & Plan notes found for this encounter  Diagnoses and all orders for this visit:    Laceration and contusion of cerebral cortex without loss of consciousness, unspecified laterality[facility]    Visit for suture removal    Cellulitis and abscess of left leg  -     cephalexin (KEFLEX) 500 mg capsule;  Take 1 capsule (500 mg total) by mouth every 12 (twelve) hours for 7 days

## 2021-05-27 NOTE — PATIENT INSTRUCTIONS
Stitches and staples removed  Well healed  Rx for keflex for 7 days and monitor for redness of knee  Call if not improved

## 2021-08-04 PROBLEM — R62.7 FAILURE TO THRIVE IN ADULT: Status: ACTIVE | Noted: 2021-01-01

## 2021-08-04 NOTE — PROGRESS NOTES
Subjective:   Chief Complaint   Patient presents with    Follow-up     4 mo        Patient ID: Odalis Nunez is a 80 y o  female  Here for routine visit  Very frail, very confused, lives with nephews at home  The following portions of the patient's history were reviewed and updated as appropriate: allergies, current medications, past family history, past medical history, past social history, past surgical history and problem list     Review of Systems   Unable to perform ROS: Dementia         Current Outpatient Medications on File Prior to Visit   Medication Sig Dispense Refill    acetaminophen (TYLENOL) 325 mg tablet Take 650 mg by mouth every 6 (six) hours as needed for mild pain      amLODIPine (NORVASC) 5 mg tablet Take 1 tablet (5 mg total) by mouth daily 90 tablet 3    aspirin 81 MG tablet Take 1 tablet by mouth daily      bimatoprost (LUMIGAN) 0 01 % ophthalmic drops Administer 1 drop to both eyes daily at bedtime      citalopram (CeleXA) 20 mg tablet Take 1 tablet (20 mg total) by mouth daily 90 tablet 3    docusate sodium (COLACE) 100 mg capsule Take 1 capsule (100 mg total) by mouth 2 (two) times a day 10 capsule 0    dorzolamide-timolol (COSOPT) 22 3-6 8 MG/ML ophthalmic solution Apply to eye Twice daily      lisinopril (ZESTRIL) 20 mg tablet Take 1 tablet (20 mg total) by mouth daily 90 tablet 3    nystatin-triamcinolone (MYCOLOG-II) ointment Apply topically 2 (two) times a day 30 g 0     No current facility-administered medications on file prior to visit  Objective:  Vitals:    08/04/21 1501   BP: 120/68   Pulse: 72   SpO2: 98%   Height: 5' 9" (1 753 m)      Physical Exam  Vitals and nursing note reviewed  Constitutional:       General: She is not in acute distress  Cardiovascular:      Rate and Rhythm: Normal rate and regular rhythm  Pulmonary:      Effort: Pulmonary effort is normal       Breath sounds: Normal breath sounds     Musculoskeletal:         General: Normal range of motion  Cervical back: Normal range of motion  Skin:     Findings: No rash  Neurological:      Mental Status: She is alert and oriented to person, place, and time  Psychiatric:         Thought Content: Thought content normal            Assessment/Plan:    Vascular dementia with behavior disturbance  Confused, can be belligerent  Some paranoid ideations  refusig to bathe  Family tries to supervise medications  She is not paying bills  Family frustrated with her lack of comprehension and cantankerousness  Failure to thrive in adult  Frailty, poor appetite and diet, sleeps a lot, hearing very poor  Diagnoses and all orders for this visit:    Advanced age  -     Ambulatory referral to hospice;  Future    Essential hypertension    Vascular dementia with behavior disturbance (HCC)    Non-ST elevation myocardial infarction (NSTEMI) ([facility] )    Failure to thrive in adult

## 2021-08-04 NOTE — ASSESSMENT & PLAN NOTE
Confused, can be belligerent  Some paranoid ideations  refusig to bathe  Family tries to supervise medications  She is not paying bills  Family frustrated with her lack of comprehension and cantankerousness

## 2021-08-31 NOTE — TELEPHONE ENCOUNTER
A certificate of terminal illness was faxed for this patient to be signed by dr Azucena Calderon  They have not received it back yet  Can this pls be signed and faxed to  [phone]  If the form cannot be located, pls call Byron Cheek @ [phone] to have it refaxed

## 2021-11-15 PROBLEM — E44.0 MODERATE PROTEIN-CALORIE MALNUTRITION (HCC): Status: ACTIVE | Noted: 2021-01-01
